# Patient Record
Sex: FEMALE | Race: WHITE | NOT HISPANIC OR LATINO | URBAN - METROPOLITAN AREA
[De-identification: names, ages, dates, MRNs, and addresses within clinical notes are randomized per-mention and may not be internally consistent; named-entity substitution may affect disease eponyms.]

---

## 2021-04-07 ENCOUNTER — OUTPATIENT (OUTPATIENT)
Dept: OUTPATIENT SERVICES | Facility: HOSPITAL | Age: 76
LOS: 1 days | End: 2021-04-07
Payer: MEDICARE

## 2021-04-07 ENCOUNTER — RESULT REVIEW (OUTPATIENT)
Age: 76
End: 2021-04-07

## 2021-04-07 VITALS
RESPIRATION RATE: 16 BRPM | HEIGHT: 61 IN | SYSTOLIC BLOOD PRESSURE: 148 MMHG | TEMPERATURE: 98 F | OXYGEN SATURATION: 99 % | DIASTOLIC BLOOD PRESSURE: 79 MMHG | HEART RATE: 67 BPM | WEIGHT: 171.96 LBS

## 2021-04-07 DIAGNOSIS — Z29.9 ENCOUNTER FOR PROPHYLACTIC MEASURES, UNSPECIFIED: ICD-10-CM

## 2021-04-07 DIAGNOSIS — Z01.818 ENCOUNTER FOR OTHER PREPROCEDURAL EXAMINATION: ICD-10-CM

## 2021-04-07 DIAGNOSIS — M17.12 UNILATERAL PRIMARY OSTEOARTHRITIS, LEFT KNEE: ICD-10-CM

## 2021-04-07 DIAGNOSIS — Z98.890 OTHER SPECIFIED POSTPROCEDURAL STATES: Chronic | ICD-10-CM

## 2021-04-07 DIAGNOSIS — Z98.49 CATARACT EXTRACTION STATUS, UNSPECIFIED EYE: Chronic | ICD-10-CM

## 2021-04-07 LAB
ALBUMIN SERPL ELPH-MCNC: 4 G/DL — SIGNIFICANT CHANGE UP (ref 3.3–5)
ANION GAP SERPL CALC-SCNC: 3 MMOL/L — LOW (ref 5–17)
BASOPHILS # BLD AUTO: 0.06 K/UL — SIGNIFICANT CHANGE UP (ref 0–0.2)
BASOPHILS NFR BLD AUTO: 0.5 % — SIGNIFICANT CHANGE UP (ref 0–2)
BUN SERPL-MCNC: 23 MG/DL — SIGNIFICANT CHANGE UP (ref 7–23)
CALCIUM SERPL-MCNC: 9.6 MG/DL — SIGNIFICANT CHANGE UP (ref 8.5–10.1)
CHLORIDE SERPL-SCNC: 103 MMOL/L — SIGNIFICANT CHANGE UP (ref 96–108)
CO2 SERPL-SCNC: 32 MMOL/L — HIGH (ref 22–31)
CREAT SERPL-MCNC: 0.93 MG/DL — SIGNIFICANT CHANGE UP (ref 0.5–1.3)
EOSINOPHIL # BLD AUTO: 0.19 K/UL — SIGNIFICANT CHANGE UP (ref 0–0.5)
EOSINOPHIL NFR BLD AUTO: 1.7 % — SIGNIFICANT CHANGE UP (ref 0–6)
GLUCOSE SERPL-MCNC: 89 MG/DL — SIGNIFICANT CHANGE UP (ref 70–99)
HCT VFR BLD CALC: 45.4 % — HIGH (ref 34.5–45)
HGB BLD-MCNC: 14.4 G/DL — SIGNIFICANT CHANGE UP (ref 11.5–15.5)
IMM GRANULOCYTES NFR BLD AUTO: 0.3 % — SIGNIFICANT CHANGE UP (ref 0–1.5)
INR BLD: 1.08 RATIO — SIGNIFICANT CHANGE UP (ref 0.88–1.16)
LYMPHOCYTES # BLD AUTO: 2.47 K/UL — SIGNIFICANT CHANGE UP (ref 1–3.3)
LYMPHOCYTES # BLD AUTO: 22 % — SIGNIFICANT CHANGE UP (ref 13–44)
MCHC RBC-ENTMCNC: 28.8 PG — SIGNIFICANT CHANGE UP (ref 27–34)
MCHC RBC-ENTMCNC: 31.7 GM/DL — LOW (ref 32–36)
MCV RBC AUTO: 90.8 FL — SIGNIFICANT CHANGE UP (ref 80–100)
MONOCYTES # BLD AUTO: 0.71 K/UL — SIGNIFICANT CHANGE UP (ref 0–0.9)
MONOCYTES NFR BLD AUTO: 6.3 % — SIGNIFICANT CHANGE UP (ref 2–14)
NEUTROPHILS # BLD AUTO: 7.75 K/UL — HIGH (ref 1.8–7.4)
NEUTROPHILS NFR BLD AUTO: 69.2 % — SIGNIFICANT CHANGE UP (ref 43–77)
PLATELET # BLD AUTO: 351 K/UL — SIGNIFICANT CHANGE UP (ref 150–400)
POTASSIUM SERPL-MCNC: 3.8 MMOL/L — SIGNIFICANT CHANGE UP (ref 3.5–5.3)
POTASSIUM SERPL-SCNC: 3.8 MMOL/L — SIGNIFICANT CHANGE UP (ref 3.5–5.3)
PROTHROM AB SERPL-ACNC: 12.6 SEC — SIGNIFICANT CHANGE UP (ref 10.6–13.6)
RBC # BLD: 5 M/UL — SIGNIFICANT CHANGE UP (ref 3.8–5.2)
RBC # FLD: 12.8 % — SIGNIFICANT CHANGE UP (ref 10.3–14.5)
SODIUM SERPL-SCNC: 138 MMOL/L — SIGNIFICANT CHANGE UP (ref 135–145)
WBC # BLD: 11.21 K/UL — HIGH (ref 3.8–10.5)
WBC # FLD AUTO: 11.21 K/UL — HIGH (ref 3.8–10.5)

## 2021-04-07 PROCEDURE — 71046 X-RAY EXAM CHEST 2 VIEWS: CPT | Mod: 26

## 2021-04-07 PROCEDURE — 80048 BASIC METABOLIC PNL TOTAL CA: CPT

## 2021-04-07 PROCEDURE — 87640 STAPH A DNA AMP PROBE: CPT

## 2021-04-07 PROCEDURE — G0463: CPT | Mod: 25

## 2021-04-07 PROCEDURE — 71046 X-RAY EXAM CHEST 2 VIEWS: CPT

## 2021-04-07 PROCEDURE — 82040 ASSAY OF SERUM ALBUMIN: CPT

## 2021-04-07 PROCEDURE — 93010 ELECTROCARDIOGRAM REPORT: CPT

## 2021-04-07 PROCEDURE — 83036 HEMOGLOBIN GLYCOSYLATED A1C: CPT

## 2021-04-07 PROCEDURE — 93005 ELECTROCARDIOGRAM TRACING: CPT

## 2021-04-07 PROCEDURE — 86900 BLOOD TYPING SEROLOGIC ABO: CPT

## 2021-04-07 PROCEDURE — 87641 MR-STAPH DNA AMP PROBE: CPT

## 2021-04-07 PROCEDURE — 85025 COMPLETE CBC W/AUTO DIFF WBC: CPT

## 2021-04-07 PROCEDURE — 86850 RBC ANTIBODY SCREEN: CPT

## 2021-04-07 PROCEDURE — 86901 BLOOD TYPING SEROLOGIC RH(D): CPT

## 2021-04-07 PROCEDURE — 85610 PROTHROMBIN TIME: CPT

## 2021-04-07 PROCEDURE — 36415 COLL VENOUS BLD VENIPUNCTURE: CPT

## 2021-04-07 NOTE — H&P PST ADULT - NSICDXPASTSURGICALHX_GEN_ALL_CORE_FT
PAST SURGICAL HISTORY:  H/O ovarian cystectomy 1980 with appendectomy    History of cataract surgery 2019 2020

## 2021-04-07 NOTE — H&P PST ADULT - ASSESSMENT
75 year old female presents to PST for planned left TKR    Plan:  1. PST instructions given ; NPO status instructions to be given by ASU   2. Pt instructed to take following meds with sip of water :   3. Pt instructed to take routine evening medications unless indicated   4. Stop NSAIDS ( Aspirin Alev Motrin Mobic Diclofenac), herbal supplements , MVI , Vitamin fish oil 7 days prior to surgery  unless   directed by surgeon or cardiologist;   5. Medical Optimization  with    6. EZ wash instructions given & mupirocin instructions given  7. Labs EKG CXR as per surgeon request   8. Pt instructed to self quarantine after Covid test   9. Covid Testing scheduled Pt notified and aware  10. Pt denies covid symptoms shortness of breath fever cough         CAPRINI SCORE [CLOT]    AGE RELATED RISK FACTORS                                                       MOBILITY RELATED FACTORS  [ ] Age 41-60 years                                            (1 Point)                  [ ] Bed rest                                                        (1 Point)  [ ] Age: 61-74 years                                           (2 Points)                 [ ] Plaster cast                                                   (2 Points)  [ ] Age= 75 years                                              (3 Points)                 [ ] Bed bound for more than 72 hours                 (2 Points)    DISEASE RELATED RISK FACTORS                                               GENDER SPECIFIC FACTORS  [ ] Edema in the lower extremities                       (1 Point)                  [ ] Pregnancy                                                     (1 Point)  [ ] Varicose veins                                               (1 Point)                  [ ] Post-partum < 6 weeks                                   (1 Point)             [ ] BMI > 25 Kg/m2                                            (1 Point)                  [ ] Hormonal therapy  or oral contraception          (1 Point)                 [ ] Sepsis (in the previous month)                        (1 Point)                  [ ] History of pregnancy complications                 (1 point)  [ ] Pneumonia or serious lung disease                                               [ ] Unexplained or recurrent                     (1 Point)           (in the previous month)                               (1 Point)  [ ] Abnormal pulmonary function test                     (1 Point)                 SURGERY RELATED RISK FACTORS  [ ] Acute myocardial infarction                              (1 Point)                 [ ]  Section                                             (1 Point)  [ ] Congestive heart failure (in the previous month)  (1 Point)               [ ] Minor surgery                                                  (1 Point)   [ ] Inflammatory bowel disease                             (1 Point)                 [ ] Arthroscopic surgery                                        (2 Points)  [ ] Central venous access                                      (2 Points)                [ ] General surgery lasting more than 45 minutes   (2 Points)       [ ] Stroke (in the previous month)                          (5 Points)               [ ] Elective arthroplasty                                         (5 Points)            ( ) presents and past malignancy                           (2 points)                                                                                                         HEMATOLOGY RELATED FACTORS                                                 TRAUMA RELATED RISK FACTORS  [ ] Prior episodes of VTE                                     (3 Points)                 [ ] Fracture of the hip, pelvis, or leg                       (5 Points)  [ ] Positive family history for VTE                         (3 Points)                 [ ] Acute spinal cord injury (in the previous month)  (5 Points)  [ ] Prothrombin 41431 A                                     (3 Points)                 [ ] Paralysis  (less than 1 month)                             (5 Points)  [ ] Factor V Leiden                                             (3 Points)                  [ ] Multiple Trauma within 1 month                        (5 Points)  [ ] Lupus anticoagulants                                     (3 Points)                                                           [ ] Anticardiolipin antibodies                               (3 Points)                                                       [ ] High homocysteine in the blood                      (3 Points)                                             [ ] Other congenital or acquired thrombophilia      (3 Points)                                                [ ] Heparin induced thrombocytopenia                  (3 Points)                                          Total Score [          ] 75 year old female presents to PST for planned left TKR    Plan:  1. PST instructions given ; NPO status instructions to be given by ASU   2. Pt instructed to take following meds with sip of water : singulair  3. Pt instructed to take routine evening medications unless indicated   4. Stop NSAIDS ( Aspirin Alev Motrin Mobic Diclofenac), herbal supplements , MVI , Vitamin fish oil 7 days prior to surgery  unless   directed by surgeon or cardiologist;   5. Medical Optimization  with Dr Gao  6. EZ wash instructions given & mupirocin instructions given  7. Labs EKG CXR as per surgeon request   8. Pt instructed to self quarantine after Covid test   9. Covid Testing scheduled Pt notified and aware  10. Pt denies covid symptoms shortness of breath fever cough         CAPRINI SCORE [CLOT]    AGE RELATED RISK FACTORS                                                       MOBILITY RELATED FACTORS  [ ] Age 41-60 years                                            (1 Point)                  [ ] Bed rest                                                        (1 Point)  [ ] Age: 61-74 years                                           (2 Points)                 [ ] Plaster cast                                                   (2 Points)  [ x] Age= 75 years                                              (3 Points)                 [ ] Bed bound for more than 72 hours                 (2 Points)    DISEASE RELATED RISK FACTORS                                               GENDER SPECIFIC FACTORS  [ ] Edema in the lower extremities                       (1 Point)                  [ ] Pregnancy                                                     (1 Point)  [ ] Varicose veins                                               (1 Point)                  [ ] Post-partum < 6 weeks                                   (1 Point)             [x ] BMI > 25 Kg/m2                                            (1 Point)                  [ ] Hormonal therapy  or oral contraception          (1 Point)                 [ ] Sepsis (in the previous month)                        (1 Point)                  [ ] History of pregnancy complications                 (1 point)  [ ] Pneumonia or serious lung disease                                               [ ] Unexplained or recurrent                     (1 Point)           (in the previous month)                               (1 Point)  [ ] Abnormal pulmonary function test                     (1 Point)                 SURGERY RELATED RISK FACTORS  [ ] Acute myocardial infarction                              (1 Point)                 [ ]  Section                                             (1 Point)  [ ] Congestive heart failure (in the previous month)  (1 Point)               [ ] Minor surgery                                                  (1 Point)   [ ] Inflammatory bowel disease                             (1 Point)                 [ ] Arthroscopic surgery                                        (2 Points)  [ ] Central venous access                                      (2 Points)                [ ] General surgery lasting more than 45 minutes   (2 Points)       [ ] Stroke (in the previous month)                          (5 Points)               [x ] Elective arthroplasty                                         (5 Points)            ( ) presents and past malignancy                           (2 points)                                                                                                         HEMATOLOGY RELATED FACTORS                                                 TRAUMA RELATED RISK FACTORS  [ ] Prior episodes of VTE                                     (3 Points)                 [ ] Fracture of the hip, pelvis, or leg                       (5 Points)  [ ] Positive family history for VTE                         (3 Points)                 [ ] Acute spinal cord injury (in the previous month)  (5 Points)  [ ] Prothrombin 00953 A                                     (3 Points)                 [ ] Paralysis  (less than 1 month)                             (5 Points)  [ ] Factor V Leiden                                             (3 Points)                  [ ] Multiple Trauma within 1 month                        (5 Points)  [ ] Lupus anticoagulants                                     (3 Points)                                                           [ ] Anticardiolipin antibodies                               (3 Points)                                                       [ ] High homocysteine in the blood                      (3 Points)                                             [ ] Other congenital or acquired thrombophilia      (3 Points)                                                [ ] Heparin induced thrombocytopenia                  (3 Points)                                          Total Score [     9     ] 75 year old female presents to PST for planned left TKR    Plan:  1. PST instructions given ; NPO status instructions to be given by ASU   2. Pt instructed to take following meds with sip of water : singulair  3. Pt instructed to take routine evening medications unless indicated   4. Stop NSAIDS ( Aspirin Alev Motrin Mobic Diclofenac), herbal supplements , MVI , Vitamin fish oil 7 days prior to surgery  unless   directed by surgeon or cardiologist;   5. Medical Optimization  with Dr Gao  6. EZ wash instructions given & mupirocin instructions given  7. Labs EKG CXR as per surgeon request   8. Pt instructed to self quarantine after Covid test   9. Covid Testing scheduled Pt notified and aware  10. Pt denies covid symptoms shortness of breath fever cough   11. Patient with first degree relatives with malignant hyperthermia pt herself has not had an episode of malignant hyperthermia; Anesthesia consult with Dr Yost over the phone as per pt         CAPRINI SCORE [CLOT]    AGE RELATED RISK FACTORS                                                       MOBILITY RELATED FACTORS  [ ] Age 41-60 years                                            (1 Point)                  [ ] Bed rest                                                        (1 Point)  [ ] Age: 61-74 years                                           (2 Points)                 [ ] Plaster cast                                                   (2 Points)  [ x] Age= 75 years                                              (3 Points)                 [ ] Bed bound for more than 72 hours                 (2 Points)    DISEASE RELATED RISK FACTORS                                               GENDER SPECIFIC FACTORS  [ ] Edema in the lower extremities                       (1 Point)                  [ ] Pregnancy                                                     (1 Point)  [ ] Varicose veins                                               (1 Point)                  [ ] Post-partum < 6 weeks                                   (1 Point)             [x ] BMI > 25 Kg/m2                                            (1 Point)                  [ ] Hormonal therapy  or oral contraception          (1 Point)                 [ ] Sepsis (in the previous month)                        (1 Point)                  [ ] History of pregnancy complications                 (1 point)  [ ] Pneumonia or serious lung disease                                               [ ] Unexplained or recurrent                     (1 Point)           (in the previous month)                               (1 Point)  [ ] Abnormal pulmonary function test                     (1 Point)                 SURGERY RELATED RISK FACTORS  [ ] Acute myocardial infarction                              (1 Point)                 [ ]  Section                                             (1 Point)  [ ] Congestive heart failure (in the previous month)  (1 Point)               [ ] Minor surgery                                                  (1 Point)   [ ] Inflammatory bowel disease                             (1 Point)                 [ ] Arthroscopic surgery                                        (2 Points)  [ ] Central venous access                                      (2 Points)                [ ] General surgery lasting more than 45 minutes   (2 Points)       [ ] Stroke (in the previous month)                          (5 Points)               [x ] Elective arthroplasty                                         (5 Points)            ( ) presents and past malignancy                           (2 points)                                                                                                         HEMATOLOGY RELATED FACTORS                                                 TRAUMA RELATED RISK FACTORS  [ ] Prior episodes of VTE                                     (3 Points)                 [ ] Fracture of the hip, pelvis, or leg                       (5 Points)  [ ] Positive family history for VTE                         (3 Points)                 [ ] Acute spinal cord injury (in the previous month)  (5 Points)  [ ] Prothrombin 35458 A                                     (3 Points)                 [ ] Paralysis  (less than 1 month)                             (5 Points)  [ ] Factor V Leiden                                             (3 Points)                  [ ] Multiple Trauma within 1 month                        (5 Points)  [ ] Lupus anticoagulants                                     (3 Points)                                                           [ ] Anticardiolipin antibodies                               (3 Points)                                                       [ ] High homocysteine in the blood                      (3 Points)                                             [ ] Other congenital or acquired thrombophilia      (3 Points)                                                [ ] Heparin induced thrombocytopenia                  (3 Points)                                          Total Score [     9     ]

## 2021-04-07 NOTE — H&P PST ADULT - HEALTH CARE MAINTENANCE
flu vaccine and covid vaccine current   [place] Pt denies  travel internationally for the past 21 days

## 2021-04-07 NOTE — H&P PST ADULT - ANESTHESIA, PREVIOUS REACTION, PROFILE
first cousins with malignant hyperthermia spoke with Dr Yost anesthesiologist first cousins with malignant hyperthermia spoke with Dr Yost anesthesiologist/none

## 2021-04-07 NOTE — H&P PST ADULT - NSICDXFAMILYHX_GEN_ALL_CORE_FT
FAMILY HISTORY:  Mother  Still living? Unknown  FH: Alzheimers disease, Age at diagnosis: Age Unknown  FH: hypertension, Age at diagnosis: Age Unknown

## 2021-04-07 NOTE — H&P PST ADULT - NSICDXPASTMEDICALHX_GEN_ALL_CORE_FT
PAST MEDICAL HISTORY:  Arthritis     Asthma controlled; never intubated    Bakers cyst, left     HLD (hyperlipidemia)     HTN (hypertension)     Liver cyst     Lumbar herniated disc     Microscopic hematuria history    Seasonal allergies      PAST MEDICAL HISTORY:  Arthritis     Asthma controlled; never intubated    Bakers cyst, left     DDD (degenerative disc disease), cervical     HLD (hyperlipidemia)     HTN (hypertension)     Liver cyst     Lumbar herniated disc     Microscopic hematuria history    Seasonal allergies

## 2021-04-08 DIAGNOSIS — M17.12 UNILATERAL PRIMARY OSTEOARTHRITIS, LEFT KNEE: ICD-10-CM

## 2021-04-08 DIAGNOSIS — Z01.818 ENCOUNTER FOR OTHER PREPROCEDURAL EXAMINATION: ICD-10-CM

## 2021-04-08 LAB
A1C WITH ESTIMATED AVERAGE GLUCOSE RESULT: 5.4 % — SIGNIFICANT CHANGE UP (ref 4–5.6)
ESTIMATED AVERAGE GLUCOSE: 108 MG/DL — SIGNIFICANT CHANGE UP (ref 68–114)
MRSA PCR RESULT.: SIGNIFICANT CHANGE UP
S AUREUS DNA NOSE QL NAA+PROBE: DETECTED

## 2021-04-09 RX ORDER — ATORVASTATIN CALCIUM 80 MG/1
0 TABLET, FILM COATED ORAL
Qty: 0 | Refills: 0 | DISCHARGE

## 2021-04-09 NOTE — CHART NOTE - NSCHARTNOTEFT_GEN_A_CORE
MSSA detected from nasal swab done 4/7/2021. Patient instructed on the use of Mupirocin. To start using Mupirocin twice per day nasally, 4/11/2021  to 4/15/ 2021.

## 2021-04-12 PROBLEM — Z00.00 ENCOUNTER FOR PREVENTIVE HEALTH EXAMINATION: Status: ACTIVE | Noted: 2021-04-12

## 2021-04-13 ENCOUNTER — APPOINTMENT (OUTPATIENT)
Dept: DISASTER EMERGENCY | Facility: CLINIC | Age: 76
End: 2021-04-13

## 2021-04-13 DIAGNOSIS — Z01.818 ENCOUNTER FOR OTHER PREPROCEDURAL EXAMINATION: ICD-10-CM

## 2021-04-14 LAB — SARS-COV-2 N GENE NPH QL NAA+PROBE: NOT DETECTED

## 2021-04-15 ENCOUNTER — FORM ENCOUNTER (OUTPATIENT)
Age: 76
End: 2021-04-15

## 2021-04-15 RX ORDER — SODIUM CHLORIDE 9 MG/ML
3 INJECTION INTRAMUSCULAR; INTRAVENOUS; SUBCUTANEOUS EVERY 8 HOURS
Refills: 0 | Status: DISCONTINUED | OUTPATIENT
Start: 2021-04-16 | End: 2021-04-17

## 2021-04-16 ENCOUNTER — TRANSCRIPTION ENCOUNTER (OUTPATIENT)
Age: 76
End: 2021-04-16

## 2021-04-16 ENCOUNTER — OUTPATIENT (OUTPATIENT)
Dept: INPATIENT UNIT | Facility: HOSPITAL | Age: 76
LOS: 1 days | Discharge: HOME HEALTH | End: 2021-04-16
Payer: MEDICARE

## 2021-04-16 ENCOUNTER — RESULT REVIEW (OUTPATIENT)
Age: 76
End: 2021-04-16

## 2021-04-16 VITALS
WEIGHT: 169.98 LBS | TEMPERATURE: 98 F | DIASTOLIC BLOOD PRESSURE: 84 MMHG | HEART RATE: 75 BPM | OXYGEN SATURATION: 100 % | RESPIRATION RATE: 16 BRPM | SYSTOLIC BLOOD PRESSURE: 128 MMHG

## 2021-04-16 DIAGNOSIS — E87.6 HYPOKALEMIA: ICD-10-CM

## 2021-04-16 DIAGNOSIS — M81.0 AGE-RELATED OSTEOPOROSIS WITHOUT CURRENT PATHOLOGICAL FRACTURE: ICD-10-CM

## 2021-04-16 DIAGNOSIS — M85.80 OTHER SPECIFIED DISORDERS OF BONE DENSITY AND STRUCTURE, UNSPECIFIED SITE: ICD-10-CM

## 2021-04-16 DIAGNOSIS — Z88.8 ALLERGY STATUS TO OTHER DRUGS, MEDICAMENTS AND BIOLOGICAL SUBSTANCES STATUS: ICD-10-CM

## 2021-04-16 DIAGNOSIS — M17.12 UNILATERAL PRIMARY OSTEOARTHRITIS, LEFT KNEE: ICD-10-CM

## 2021-04-16 DIAGNOSIS — Z79.899 OTHER LONG TERM (CURRENT) DRUG THERAPY: ICD-10-CM

## 2021-04-16 DIAGNOSIS — G89.29 OTHER CHRONIC PAIN: ICD-10-CM

## 2021-04-16 DIAGNOSIS — J45.909 UNSPECIFIED ASTHMA, UNCOMPLICATED: ICD-10-CM

## 2021-04-16 DIAGNOSIS — M21.062 VALGUS DEFORMITY, NOT ELSEWHERE CLASSIFIED, LEFT KNEE: ICD-10-CM

## 2021-04-16 DIAGNOSIS — Z98.890 OTHER SPECIFIED POSTPROCEDURAL STATES: Chronic | ICD-10-CM

## 2021-04-16 DIAGNOSIS — K76.89 OTHER SPECIFIED DISEASES OF LIVER: ICD-10-CM

## 2021-04-16 DIAGNOSIS — Z98.49 CATARACT EXTRACTION STATUS, UNSPECIFIED EYE: Chronic | ICD-10-CM

## 2021-04-16 DIAGNOSIS — E66.9 OBESITY, UNSPECIFIED: ICD-10-CM

## 2021-04-16 DIAGNOSIS — Z91.040 LATEX ALLERGY STATUS: ICD-10-CM

## 2021-04-16 DIAGNOSIS — E78.00 PURE HYPERCHOLESTEROLEMIA, UNSPECIFIED: ICD-10-CM

## 2021-04-16 DIAGNOSIS — I10 ESSENTIAL (PRIMARY) HYPERTENSION: ICD-10-CM

## 2021-04-16 DIAGNOSIS — Z88.1 ALLERGY STATUS TO OTHER ANTIBIOTIC AGENTS STATUS: ICD-10-CM

## 2021-04-16 DIAGNOSIS — M50.30 OTHER CERVICAL DISC DEGENERATION, UNSPECIFIED CERVICAL REGION: ICD-10-CM

## 2021-04-16 LAB
ANION GAP SERPL CALC-SCNC: 6 MMOL/L — SIGNIFICANT CHANGE UP (ref 5–17)
BUN SERPL-MCNC: 22 MG/DL — SIGNIFICANT CHANGE UP (ref 7–23)
CALCIUM SERPL-MCNC: 8.1 MG/DL — LOW (ref 8.5–10.1)
CHLORIDE SERPL-SCNC: 108 MMOL/L — SIGNIFICANT CHANGE UP (ref 96–108)
CO2 SERPL-SCNC: 27 MMOL/L — SIGNIFICANT CHANGE UP (ref 22–31)
CREAT SERPL-MCNC: 0.97 MG/DL — SIGNIFICANT CHANGE UP (ref 0.5–1.3)
GLUCOSE SERPL-MCNC: 81 MG/DL — SIGNIFICANT CHANGE UP (ref 70–99)
HCT VFR BLD CALC: 39.7 % — SIGNIFICANT CHANGE UP (ref 34.5–45)
HGB BLD-MCNC: 12.4 G/DL — SIGNIFICANT CHANGE UP (ref 11.5–15.5)
MCHC RBC-ENTMCNC: 28.6 PG — SIGNIFICANT CHANGE UP (ref 27–34)
MCHC RBC-ENTMCNC: 31.2 GM/DL — LOW (ref 32–36)
MCV RBC AUTO: 91.7 FL — SIGNIFICANT CHANGE UP (ref 80–100)
PLATELET # BLD AUTO: 253 K/UL — SIGNIFICANT CHANGE UP (ref 150–400)
POTASSIUM SERPL-MCNC: 3.3 MMOL/L — LOW (ref 3.5–5.3)
POTASSIUM SERPL-SCNC: 3.3 MMOL/L — LOW (ref 3.5–5.3)
RBC # BLD: 4.33 M/UL — SIGNIFICANT CHANGE UP (ref 3.8–5.2)
RBC # FLD: 12.9 % — SIGNIFICANT CHANGE UP (ref 10.3–14.5)
SODIUM SERPL-SCNC: 141 MMOL/L — SIGNIFICANT CHANGE UP (ref 135–145)
WBC # BLD: 11.13 K/UL — HIGH (ref 3.8–10.5)
WBC # FLD AUTO: 11.13 K/UL — HIGH (ref 3.8–10.5)

## 2021-04-16 PROCEDURE — 99222 1ST HOSP IP/OBS MODERATE 55: CPT

## 2021-04-16 PROCEDURE — 80048 BASIC METABOLIC PNL TOTAL CA: CPT

## 2021-04-16 PROCEDURE — C1776: CPT

## 2021-04-16 PROCEDURE — 73560 X-RAY EXAM OF KNEE 1 OR 2: CPT | Mod: 26,LT

## 2021-04-16 PROCEDURE — 99223 1ST HOSP IP/OBS HIGH 75: CPT

## 2021-04-16 PROCEDURE — 85027 COMPLETE CBC AUTOMATED: CPT

## 2021-04-16 PROCEDURE — 97530 THERAPEUTIC ACTIVITIES: CPT | Mod: GP

## 2021-04-16 PROCEDURE — 82962 GLUCOSE BLOOD TEST: CPT

## 2021-04-16 PROCEDURE — 97116 GAIT TRAINING THERAPY: CPT | Mod: GP

## 2021-04-16 PROCEDURE — 88305 TISSUE EXAM BY PATHOLOGIST: CPT | Mod: 26

## 2021-04-16 PROCEDURE — 27447 TOTAL KNEE ARTHROPLASTY: CPT | Mod: AS

## 2021-04-16 PROCEDURE — 88305 TISSUE EXAM BY PATHOLOGIST: CPT

## 2021-04-16 PROCEDURE — C1713: CPT

## 2021-04-16 PROCEDURE — 73560 X-RAY EXAM OF KNEE 1 OR 2: CPT | Mod: LT

## 2021-04-16 PROCEDURE — 86769 SARS-COV-2 COVID-19 ANTIBODY: CPT

## 2021-04-16 PROCEDURE — 36415 COLL VENOUS BLD VENIPUNCTURE: CPT

## 2021-04-16 PROCEDURE — 97162 PT EVAL MOD COMPLEX 30 MIN: CPT | Mod: GP

## 2021-04-16 RX ORDER — RIVAROXABAN 15 MG-20MG
1 KIT ORAL
Qty: 10 | Refills: 0
Start: 2021-04-16 | End: 2021-04-25

## 2021-04-16 RX ORDER — ASPIRIN/CALCIUM CARB/MAGNESIUM 324 MG
325 TABLET ORAL
Refills: 0 | Status: DISCONTINUED | OUTPATIENT
Start: 2021-04-16 | End: 2021-04-16

## 2021-04-16 RX ORDER — POLYETHYLENE GLYCOL 3350 17 G/17G
17 POWDER, FOR SOLUTION ORAL
Qty: 1 | Refills: 0
Start: 2021-04-16

## 2021-04-16 RX ORDER — FLUVASTATIN SODIUM 80 MG/1
20 TABLET, FILM COATED, EXTENDED RELEASE ORAL AT BEDTIME
Refills: 0 | Status: DISCONTINUED | OUTPATIENT
Start: 2021-04-16 | End: 2021-04-17

## 2021-04-16 RX ORDER — HYDROMORPHONE HYDROCHLORIDE 2 MG/ML
0.5 INJECTION INTRAMUSCULAR; INTRAVENOUS; SUBCUTANEOUS EVERY 4 HOURS
Refills: 0 | Status: DISCONTINUED | OUTPATIENT
Start: 2021-04-16 | End: 2021-04-16

## 2021-04-16 RX ORDER — OXYCODONE HYDROCHLORIDE 5 MG/1
5 TABLET ORAL ONCE
Refills: 0 | Status: DISCONTINUED | OUTPATIENT
Start: 2021-04-16 | End: 2021-04-16

## 2021-04-16 RX ORDER — FOLIC ACID 0.8 MG
1 TABLET ORAL DAILY
Refills: 0 | Status: DISCONTINUED | OUTPATIENT
Start: 2021-04-16 | End: 2021-04-17

## 2021-04-16 RX ORDER — ALBUTEROL 90 UG/1
2 AEROSOL, METERED ORAL
Qty: 0 | Refills: 0 | DISCHARGE

## 2021-04-16 RX ORDER — ACETAMINOPHEN 500 MG
975 TABLET ORAL ONCE
Refills: 0 | Status: COMPLETED | OUTPATIENT
Start: 2021-04-16 | End: 2021-04-16

## 2021-04-16 RX ORDER — ERGOCALCIFEROL 1.25 MG/1
0 CAPSULE ORAL
Qty: 0 | Refills: 0 | DISCHARGE

## 2021-04-16 RX ORDER — SENNA PLUS 8.6 MG/1
2 TABLET ORAL AT BEDTIME
Refills: 0 | Status: DISCONTINUED | OUTPATIENT
Start: 2021-04-16 | End: 2021-04-17

## 2021-04-16 RX ORDER — SODIUM CHLORIDE 9 MG/ML
1000 INJECTION, SOLUTION INTRAVENOUS
Refills: 0 | Status: DISCONTINUED | OUTPATIENT
Start: 2021-04-16 | End: 2021-04-16

## 2021-04-16 RX ORDER — LOSARTAN POTASSIUM 100 MG/1
1 TABLET, FILM COATED ORAL
Qty: 0 | Refills: 0 | DISCHARGE

## 2021-04-16 RX ORDER — FENTANYL CITRATE 50 UG/ML
50 INJECTION INTRAVENOUS
Refills: 0 | Status: DISCONTINUED | OUTPATIENT
Start: 2021-04-16 | End: 2021-04-16

## 2021-04-16 RX ORDER — OXYCODONE HYDROCHLORIDE 5 MG/1
10 TABLET ORAL EVERY 4 HOURS
Refills: 0 | Status: DISCONTINUED | OUTPATIENT
Start: 2021-04-16 | End: 2021-04-17

## 2021-04-16 RX ORDER — PREDNISOLONE 5 MG
40 TABLET ORAL
Qty: 0 | Refills: 0 | DISCHARGE

## 2021-04-16 RX ORDER — CEFAZOLIN SODIUM 1 G
2000 VIAL (EA) INJECTION EVERY 8 HOURS
Refills: 0 | Status: COMPLETED | OUTPATIENT
Start: 2021-04-16 | End: 2021-04-17

## 2021-04-16 RX ORDER — ALBUTEROL 90 UG/1
2 AEROSOL, METERED ORAL EVERY 4 HOURS
Refills: 0 | Status: DISCONTINUED | OUTPATIENT
Start: 2021-04-16 | End: 2021-04-17

## 2021-04-16 RX ORDER — PANTOPRAZOLE SODIUM 20 MG/1
40 TABLET, DELAYED RELEASE ORAL ONCE
Refills: 0 | Status: COMPLETED | OUTPATIENT
Start: 2021-04-16 | End: 2021-04-16

## 2021-04-16 RX ORDER — ONDANSETRON 8 MG/1
4 TABLET, FILM COATED ORAL ONCE
Refills: 0 | Status: DISCONTINUED | OUTPATIENT
Start: 2021-04-16 | End: 2021-04-16

## 2021-04-16 RX ORDER — FERROUS SULFATE 325(65) MG
325 TABLET ORAL DAILY
Refills: 0 | Status: DISCONTINUED | OUTPATIENT
Start: 2021-04-16 | End: 2021-04-17

## 2021-04-16 RX ORDER — HYDROCHLOROTHIAZIDE 25 MG
12.5 TABLET ORAL DAILY
Refills: 0 | Status: DISCONTINUED | OUTPATIENT
Start: 2021-04-16 | End: 2021-04-16

## 2021-04-16 RX ORDER — PANTOPRAZOLE SODIUM 20 MG/1
40 TABLET, DELAYED RELEASE ORAL
Refills: 0 | Status: DISCONTINUED | OUTPATIENT
Start: 2021-04-16 | End: 2021-04-17

## 2021-04-16 RX ORDER — ATORVASTATIN CALCIUM 80 MG/1
10 TABLET, FILM COATED ORAL AT BEDTIME
Refills: 0 | Status: DISCONTINUED | OUTPATIENT
Start: 2021-04-16 | End: 2021-04-16

## 2021-04-16 RX ORDER — MONTELUKAST 4 MG/1
10 TABLET, CHEWABLE ORAL DAILY
Refills: 0 | Status: DISCONTINUED | OUTPATIENT
Start: 2021-04-16 | End: 2021-04-17

## 2021-04-16 RX ORDER — POTASSIUM CHLORIDE 20 MEQ
40 PACKET (EA) ORAL ONCE
Refills: 0 | Status: COMPLETED | OUTPATIENT
Start: 2021-04-16 | End: 2021-04-16

## 2021-04-16 RX ORDER — PROCHLORPERAZINE MALEATE 5 MG
10 TABLET ORAL EVERY 8 HOURS
Refills: 0 | Status: DISCONTINUED | OUTPATIENT
Start: 2021-04-16 | End: 2021-04-17

## 2021-04-16 RX ORDER — ASCORBIC ACID 60 MG
500 TABLET,CHEWABLE ORAL
Refills: 0 | Status: DISCONTINUED | OUTPATIENT
Start: 2021-04-16 | End: 2021-04-17

## 2021-04-16 RX ORDER — FLUVASTATIN SODIUM 80 MG/1
1 TABLET, FILM COATED, EXTENDED RELEASE ORAL
Qty: 0 | Refills: 0 | DISCHARGE

## 2021-04-16 RX ORDER — RIVAROXABAN 15 MG-20MG
10 KIT ORAL
Refills: 0 | Status: DISCONTINUED | OUTPATIENT
Start: 2021-04-16 | End: 2021-04-17

## 2021-04-16 RX ORDER — OXYCODONE HYDROCHLORIDE 5 MG/1
5 TABLET ORAL EVERY 4 HOURS
Refills: 0 | Status: DISCONTINUED | OUTPATIENT
Start: 2021-04-16 | End: 2021-04-17

## 2021-04-16 RX ORDER — OXYCODONE HYDROCHLORIDE 5 MG/1
1 TABLET ORAL
Qty: 30 | Refills: 0
Start: 2021-04-16

## 2021-04-16 RX ORDER — FAMOTIDINE 10 MG/ML
20 INJECTION INTRAVENOUS ONCE
Refills: 0 | Status: COMPLETED | OUTPATIENT
Start: 2021-04-16 | End: 2021-04-16

## 2021-04-16 RX ORDER — LOSARTAN POTASSIUM 100 MG/1
100 TABLET, FILM COATED ORAL DAILY
Refills: 0 | Status: DISCONTINUED | OUTPATIENT
Start: 2021-04-16 | End: 2021-04-16

## 2021-04-16 RX ORDER — HYDROMORPHONE HYDROCHLORIDE 2 MG/ML
0.5 INJECTION INTRAMUSCULAR; INTRAVENOUS; SUBCUTANEOUS EVERY 4 HOURS
Refills: 0 | Status: DISCONTINUED | OUTPATIENT
Start: 2021-04-16 | End: 2021-04-17

## 2021-04-16 RX ORDER — LOSARTAN/HYDROCHLOROTHIAZIDE 100MG-25MG
1 TABLET ORAL
Qty: 0 | Refills: 0 | DISCHARGE

## 2021-04-16 RX ORDER — OMEGA-3 ACID ETHYL ESTERS 1 G
0 CAPSULE ORAL
Qty: 0 | Refills: 0 | DISCHARGE

## 2021-04-16 RX ORDER — PANTOPRAZOLE SODIUM 20 MG/1
1 TABLET, DELAYED RELEASE ORAL
Qty: 30 | Refills: 0
Start: 2021-04-16 | End: 2021-05-15

## 2021-04-16 RX ORDER — CELECOXIB 200 MG/1
200 CAPSULE ORAL EVERY 12 HOURS
Refills: 0 | Status: DISCONTINUED | OUTPATIENT
Start: 2021-04-17 | End: 2021-04-17

## 2021-04-16 RX ORDER — POLYETHYLENE GLYCOL 3350 17 G/17G
17 POWDER, FOR SOLUTION ORAL AT BEDTIME
Refills: 0 | Status: DISCONTINUED | OUTPATIENT
Start: 2021-04-16 | End: 2021-04-17

## 2021-04-16 RX ORDER — ACETAMINOPHEN 500 MG
975 TABLET ORAL EVERY 8 HOURS
Refills: 0 | Status: DISCONTINUED | OUTPATIENT
Start: 2021-04-16 | End: 2021-04-17

## 2021-04-16 RX ORDER — ONDANSETRON 8 MG/1
8 TABLET, FILM COATED ORAL EVERY 8 HOURS
Refills: 0 | Status: DISCONTINUED | OUTPATIENT
Start: 2021-04-16 | End: 2021-04-17

## 2021-04-16 RX ORDER — ACETAMINOPHEN 500 MG
2 TABLET ORAL
Qty: 84 | Refills: 0
Start: 2021-04-16 | End: 2021-04-29

## 2021-04-16 RX ORDER — MONTELUKAST 4 MG/1
1 TABLET, CHEWABLE ORAL
Qty: 0 | Refills: 0 | DISCHARGE

## 2021-04-16 RX ORDER — SODIUM CHLORIDE 9 MG/ML
1000 INJECTION, SOLUTION INTRAVENOUS
Refills: 0 | Status: DISCONTINUED | OUTPATIENT
Start: 2021-04-17 | End: 2021-04-17

## 2021-04-16 RX ADMIN — Medication 975 MILLIGRAM(S): at 14:37

## 2021-04-16 RX ADMIN — Medication 100 MILLIGRAM(S): at 16:39

## 2021-04-16 RX ADMIN — FAMOTIDINE 20 MILLIGRAM(S): 10 INJECTION INTRAVENOUS at 06:29

## 2021-04-16 RX ADMIN — FLUVASTATIN SODIUM 20 MILLIGRAM(S): 80 TABLET, FILM COATED, EXTENDED RELEASE ORAL at 21:36

## 2021-04-16 RX ADMIN — Medication 10 MILLIGRAM(S): at 22:07

## 2021-04-16 RX ADMIN — ONDANSETRON 8 MILLIGRAM(S): 8 TABLET, FILM COATED ORAL at 19:14

## 2021-04-16 RX ADMIN — RIVAROXABAN 10 MILLIGRAM(S): KIT at 18:37

## 2021-04-16 RX ADMIN — Medication 975 MILLIGRAM(S): at 06:29

## 2021-04-16 RX ADMIN — Medication 40 MILLIEQUIVALENT(S): at 14:36

## 2021-04-16 RX ADMIN — SODIUM CHLORIDE 3 MILLILITER(S): 9 INJECTION INTRAMUSCULAR; INTRAVENOUS; SUBCUTANEOUS at 21:37

## 2021-04-16 RX ADMIN — Medication 975 MILLIGRAM(S): at 06:30

## 2021-04-16 RX ADMIN — Medication 40 MILLIGRAM(S): at 13:18

## 2021-04-16 RX ADMIN — PANTOPRAZOLE SODIUM 40 MILLIGRAM(S): 20 TABLET, DELAYED RELEASE ORAL at 06:30

## 2021-04-16 RX ADMIN — SODIUM CHLORIDE 100 MILLILITER(S): 9 INJECTION, SOLUTION INTRAVENOUS at 10:00

## 2021-04-16 NOTE — DISCHARGE NOTE NURSING/CASE MANAGEMENT/SOCIAL WORK - PATIENT PORTAL LINK FT
You can access the FollowMyHealth Patient Portal offered by [facility] by registering at the following website: [email or web address]. By joining [employer]’s FollowMyHealth portal, you will also be able to view your health information using other applications (apps) compatible with our system.

## 2021-04-16 NOTE — DISCHARGE NOTE PROVIDER - CARE PROVIDER_API CALL
Jeffrey Stephenson)  Orthopaedic Surgery  [address] [place] [address] [place] [address]  Phone: [phone]  Fax: [phone]  Follow Up Time:

## 2021-04-16 NOTE — DISCHARGE NOTE PROVIDER - NSDCMRMEDTOKEN_GEN_ALL_CORE_FT
Calcium 600+D oral tablet:   Hyzaar 100 mg-12.5 mg oral tablet: 1 tab(s) orally once a day  Lescol 20 mg oral capsule: 1 cap(s) orally once a day  MiraLax oral powder for reconstitution: 17 gram(s) orally once a day, As Needed   Omega-3 oral capsule:   oxyCODONE 5 mg oral tablet: 1 tab(s) orally every 6 hours MDD:6 tablets  prednisoLONE: 40 milligram(s) orally  needs 2 nore doses post op for a reaction she had from doxycycline  Protonix 40 mg oral delayed release tablet: 1 tab(s) orally once a day   Singulair 10 mg oral tablet: 1 tab(s) orally once a day  Tylenol Extra Strength 500 mg oral tablet: 2 tab(s) orally 3 times a day   Ventolin HFA 90 mcg/inh inhalation aerosol: 2 puff(s) inhaled every 4 hours, As Needed  Vitamin D2 2000 intl units (50 mcg) oral capsule:    Calcium 600+D oral tablet:   Hyzaar 100 mg-12.5 mg oral tablet: 1 tab(s) orally once a day  Lescol 20 mg oral capsule: 1 cap(s) orally once a day  MiraLax oral powder for reconstitution: 17 gram(s) orally once a day, As Needed   Omega-3 oral capsule:   oxyCODONE 5 mg oral tablet: 1 tab(s) orally every 6 hours MDD:6 tablets  prednisoLONE: 40 milligram(s) orally  needs 2 nore doses post op for a reaction she had from doxycycline  Protonix 40 mg oral delayed release tablet: 1 tab(s) orally once a day   rivaroxaban 10 mg oral tablet: 1 tab by mouth by mouth daily as directed by  Services [phone] MDD:10mg  Singulair 10 mg oral tablet: 1 tab(s) orally once a day  Tylenol Extra Strength 500 mg oral tablet: 2 tab(s) orally 3 times a day   Ventolin HFA 90 mcg/inh inhalation aerosol: 2 puff(s) inhaled every 4 hours, As Needed  Vitamin D2 2000 intl units (50 mcg) oral capsule:

## 2021-04-16 NOTE — PHYSICAL THERAPY INITIAL EVALUATION ADULT - ADDITIONAL COMMENTS
Pt reports OA B thumbs and fingers  Pt able to transfer to rolling commode from bed w/ RW and min A, Rolled to bathroom,  R knee buckling with ambulation, applied KI for ambulation from bathroom  Pt reports having B Bakers Cysts that are an additional source of pain.

## 2021-04-16 NOTE — CONSULT NOTE ADULT - SUBJECTIVE AND OBJECTIVE BOX
PCP:  Dr. Gao  Ortho:  [facility]    CHIEF COMPLAINT:   left knee pain    HISTORY OF THE PRESENT ILLNESS:  75 F with Hx of osteoarthritis of the left knee with associated pain and difficulty walking.  She has experienced progressive pain along with trouble ambulating and now needs a cane.  She has difficulty walking up stairs and performing activities of daily living.  She has tried conservative therapies with steroid and hyaluronic acid injections which have not been effective.  She decided to undergo a left total knee replacement today.  She is no post-op in her room on 2N and the hospitalist service has been asked to assist with post-op medical consultation.    At this time, she denies,     PAST MEDICAL HISTORY:  HTN  Hyperlipidemia  Osteoathritis of knees  Microscopic Hematuria  Asthma  Chronic Low Back pain  Cervical Stenosis  Cyst in Liver    PAST SURGICAL HISTORY:  s/p ovarian cyst removal  s/p cataract surgery    FAMILY HISTORY:    Mother - Dementia  Father - Dementia    SOCIAL HISTORY:  no smoking, no alcohol, no drugs    REVIEW OF SYSTEMS:   All 10 systems reviewed in detailed and found to be negative with the exception of what has already been described above    MEDICATIONS  (STANDING):  acetaminophen   Tablet .. 975 milliGRAM(s) Oral every 8 hours  ascorbic acid 500 milliGRAM(s) Oral two times a day  aspirin 325 milliGRAM(s) Oral two times a day  atorvastatin 10 milliGRAM(s) Oral at bedtime  ceFAZolin   IVPB 2000 milliGRAM(s) IV Intermittent every 8 hours  ferrous    sulfate 325 milliGRAM(s) Oral daily  folic acid 1 milliGRAM(s) Oral daily  hydrochlorothiazide 12.5 milliGRAM(s) Oral daily  HYDROmorphone  Injectable 0.5 milliGRAM(s) SubCutaneous every 4 hours  losartan 100 milliGRAM(s) Oral daily  montelukast 10 milliGRAM(s) Oral daily  multivitamin 1 Tablet(s) Oral daily  pantoprazole    Tablet 40 milliGRAM(s) Oral before breakfast  polyethylene glycol 3350 17 Gram(s) Oral at bedtime  predniSONE   Tablet 40 milliGRAM(s) Oral daily  senna 2 Tablet(s) Oral at bedtime  sodium chloride 0.9% lock flush 3 milliLiter(s) IV Push every 8 hours    MEDICATIONS  (PRN):  ondansetron Injectable 8 milliGRAM(s) IV Push every 8 hours PRN Nausea and/or Vomiting  oxyCODONE    IR 5 milliGRAM(s) Oral every 4 hours PRN Mild Pain (1 - 3)  oxyCODONE    IR 10 milliGRAM(s) Oral every 4 hours PRN Moderate Pain (4 - 6)  prochlorperazine   Injectable 10 milliGRAM(s) IV Push every 8 hours PRN Nausea and/or Vomiting    VITALS:  T(F): 98.9 (04-16-21 @ 11:30), Max: 98.9 (04-16-21 @ 11:30)  HR: 53 (04-16-21 @ 11:30) (49 - 75)  BP: 111/41 (04-16-21 @ 11:30) (106/41 - 132/66)  RR: 19 (04-16-21 @ 11:30) (12 - 21)  SpO2: 98% (04-16-21 @ 11:30) (95% - 100%)  I&O's Summary    16 Apr 2021 07:01  -  16 Apr 2021 12:34  --------------------------------------------------------  IN: 1200 mL / OUT: 0 mL / NET: 1200 mL    CAPILLARY BLOOD GLUCOSE  POCT Blood Glucose.: 88 mg/dL (16 Apr 2021 09:37)  POCT Blood Glucose.: 91 mg/dL (16 Apr 2021 06:09)    PHYSICAL EXAM:  HEENT:  pupils equal and reactive, EOMI, no oropharyngeal lesions, erythema, exudates, oral thrush  NECK:   supple, no carotid bruits, no palpable lymph nodes, no thyromegaly  CV:  +S1, +S2, regular, no murmurs or rubs  RESP:   lungs clear to auscultation bilaterally, no wheezing, rales, rhonchi, good air entry bilaterally  BREAST:  not examined  GI:  abdomen soft, non-tender, non-distended, normal BS, no bruits, no abdominal masses, no palpable masses  RECTAL:  not examined  :  not examined  MSK:   normal muscle tone, no atrophy, no rigidity, no contractions  EXT:  no clubbing, no cyanosis, no edema, no calf pain, swelling or erythema  VASCULAR:  pulses equal and symmetric in the upper and lower extremities  NEURO:  AAOX3, no focal neurological deficits, follows all commands, able to move extremities spontaneously  SKIN:  no ulcers, lesions or rashes    LABS:                        12.4   11.13 )-----------( 253      ( 16 Apr 2021 11:08 )             39.7     04-16    141  |  108  |  22  ----------------------------<  81  3.3<L>   |  27  |  0.97    Ca    8.1<L>      16 Apr 2021 11:08    IMPRESSION:    S/P ELECTIVE LEFT KNEE REPLACEMENT FOR SEVER OSTEOARTHRITIS, POD #0, EBL ~150CC    ASTHMA - CONTROLLED    HYPERTENSION    HYPERLIPIDEMIA      RECOMMENDATIONS:  -  pain control  -  incentive spirometry  -  PT evaluation and treatment  -  complete cortes-operative ABXs  -  wound care per ortho  -  neurovascular checks           PCP:  Dr. Gao  Ortho:  [facility]    CHIEF COMPLAINT:   left knee pain    HISTORY OF THE PRESENT ILLNESS:  75 F with Hx of osteoarthritis of the left knee with associated pain and difficulty walking.  She has experienced progressive pain along with trouble ambulating and now needs a cane.  She has difficulty walking up stairs and performing activities of daily living.  She has tried conservative therapies with steroid and hyaluronic acid injections which have not been effective.  She decided to undergo a left total knee replacement today.  She is no post-op in her room on 2N and the hospitalist service has been asked to assist with post-op medical consultation.    At this time, she denies any chest pain, shortness of breath, nausea, vomiting, abdominal pain.    PAST MEDICAL HISTORY:  HTN  Hyperlipidemia  Osteoarthritis of knees  Microscopic Hematuria  Asthma  Chronic Low Back pain  Cervical Stenosis  Cyst in Liver  Osteoporosis    PAST SURGICAL HISTORY:  s/p ovarian cyst removal  s/p cataract surgery    FAMILY HISTORY:    Mother - Dementia  Father - Dementia    SOCIAL HISTORY:  no smoking, no alcohol, no drugs    REVIEW OF SYSTEMS:   All 10 systems reviewed in detailed and found to be negative with the exception of what has already been described above    MEDICATIONS  (STANDING):  acetaminophen   Tablet .. 975 milliGRAM(s) Oral every 8 hours  ascorbic acid 500 milliGRAM(s) Oral two times a day  aspirin 325 milliGRAM(s) Oral two times a day  atorvastatin 10 milliGRAM(s) Oral at bedtime  ceFAZolin   IVPB 2000 milliGRAM(s) IV Intermittent every 8 hours  ferrous    sulfate 325 milliGRAM(s) Oral daily  folic acid 1 milliGRAM(s) Oral daily  hydrochlorothiazide 12.5 milliGRAM(s) Oral daily  HYDROmorphone  Injectable 0.5 milliGRAM(s) SubCutaneous every 4 hours  losartan 100 milliGRAM(s) Oral daily  montelukast 10 milliGRAM(s) Oral daily  multivitamin 1 Tablet(s) Oral daily  pantoprazole    Tablet 40 milliGRAM(s) Oral before breakfast  polyethylene glycol 3350 17 Gram(s) Oral at bedtime  predniSONE   Tablet 40 milliGRAM(s) Oral daily  senna 2 Tablet(s) Oral at bedtime  sodium chloride 0.9% lock flush 3 milliLiter(s) IV Push every 8 hours    MEDICATIONS  (PRN):  ondansetron Injectable 8 milliGRAM(s) IV Push every 8 hours PRN Nausea and/or Vomiting  oxyCODONE    IR 5 milliGRAM(s) Oral every 4 hours PRN Mild Pain (1 - 3)  oxyCODONE    IR 10 milliGRAM(s) Oral every 4 hours PRN Moderate Pain (4 - 6)  prochlorperazine   Injectable 10 milliGRAM(s) IV Push every 8 hours PRN Nausea and/or Vomiting    VITALS:  T(F): 98.9 (04-16-21 @ 11:30), Max: 98.9 (04-16-21 @ 11:30)  HR: 53 (04-16-21 @ 11:30) (49 - 75)  BP: 111/41 (04-16-21 @ 11:30) (106/41 - 132/66)  RR: 19 (04-16-21 @ 11:30) (12 - 21)  SpO2: 98% (04-16-21 @ 11:30) (95% - 100%)  I&O's Summary    16 Apr 2021 07:01  -  16 Apr 2021 12:34  --------------------------------------------------------  IN: 1200 mL / OUT: 0 mL / NET: 1200 mL    CAPILLARY BLOOD GLUCOSE  POCT Blood Glucose.: 88 mg/dL (16 Apr 2021 09:37)  POCT Blood Glucose.: 91 mg/dL (16 Apr 2021 06:09)    PHYSICAL EXAM:  HEENT:  pupils equal and reactive, EOMI, no oropharyngeal lesions, erythema, exudates, oral thrush  NECK:   supple, no carotid bruits, no palpable lymph nodes, no thyromegaly  CV:  +S1, +S2, regular, no murmurs or rubs  RESP:   lungs clear to auscultation bilaterally, no wheezing, rales, rhonchi, good air entry bilaterally  BREAST:  not examined  GI:  abdomen soft, non-tender, non-distended, normal BS, no bruits, no abdominal masses, no palpable masses  RECTAL:  not examined  :  not examined  MSK:   normal muscle tone, no atrophy, no rigidity, no contractions  EXT:  no left knee swelling with dressing  VASCULAR:  pulses equal and symmetric in the upper and lower extremities  NEURO:  AAOX3, no focal neurological deficits, follows all commands, able to move extremities spontaneously  SKIN:  no ulcers, lesions or rashes    LABS:                        12.4   11.13 )-----------( 253      ( 16 Apr 2021 11:08 )             39.7     04-16    141  |  108  |  22  ----------------------------<  81  3.3<L>   |  27  |  0.97    Ca    8.1<L>      16 Apr 2021 11:08    IMPRESSION:    S/P ELECTIVE LEFT KNEE REPLACEMENT FOR SEVER OSTEOARTHRITIS, POD #0, EBL ~150CC    ASTHMA - CONTROLLED    HYPERTENSION - STABLE    HYPERLIPIDEMIA    HYPOKALEMIA (3.3)    RECENT ALLERGIC REACTION TO DOXYCYCLINE THIS WEEK RESULTING IN HOSPITALIZATION AT [place] FOR 2 DAYS, NOW ON PO PREDNISONE FOR 2 MORE DAYS (40 MG DAILY)      RECOMMENDATIONS:  -  pain control  -  incentive spirometry  -  PT evaluation and treatment  -  complete cortes-operative ABXs  -  wound care per ortho  -  neurovascular checks  -  resume home medications (patient will take her own Hyzaar, Lescol and Prednisone)  -  Prednisone 40mg today and tomorrow and then d/c  -  check labs in am  -  replete potassium  -  Albuterol PRN  -  DVT prophylaxis  -  anticoagulation consult  -  will follow with you    d/w patient and RN           PCP:  Dr. Gao  Ortho:  [facility]    CHIEF COMPLAINT:   left knee pain    HISTORY OF THE PRESENT ILLNESS:  75 F with Hx of osteoarthritis of the left knee with associated pain and difficulty walking.  She has experienced progressive pain along with trouble ambulating and now needs a cane.  She has difficulty walking up stairs and performing activities of daily living.  She has tried conservative therapies with steroid and hyaluronic acid injections which have not been effective.  She decided to undergo a left total knee replacement today.  She is no post-op in her room on 2N and the hospitalist service has been asked to assist with post-op medical consultation.    At this time, she denies any chest pain, shortness of breath, nausea, vomiting, abdominal pain.    PAST MEDICAL HISTORY:  HTN  Hyperlipidemia  Osteoarthritis of knees  Microscopic Hematuria  Asthma  Chronic Low Back pain  Cervical Stenosis  Cyst in Liver  Osteoporosis    PAST SURGICAL HISTORY:  s/p ovarian cyst removal  s/p cataract surgery    FAMILY HISTORY:    Mother - Dementia  Father - Dementia    SOCIAL HISTORY:  no smoking, no alcohol, no drugs    REVIEW OF SYSTEMS:   All 10 systems reviewed in detailed and found to be negative with the exception of what has already been described above    MEDICATIONS  (STANDING):  acetaminophen   Tablet .. 975 milliGRAM(s) Oral every 8 hours  ascorbic acid 500 milliGRAM(s) Oral two times a day  aspirin 325 milliGRAM(s) Oral two times a day  atorvastatin 10 milliGRAM(s) Oral at bedtime  ceFAZolin   IVPB 2000 milliGRAM(s) IV Intermittent every 8 hours  ferrous    sulfate 325 milliGRAM(s) Oral daily  folic acid 1 milliGRAM(s) Oral daily  hydrochlorothiazide 12.5 milliGRAM(s) Oral daily  HYDROmorphone  Injectable 0.5 milliGRAM(s) SubCutaneous every 4 hours  losartan 100 milliGRAM(s) Oral daily  montelukast 10 milliGRAM(s) Oral daily  multivitamin 1 Tablet(s) Oral daily  pantoprazole    Tablet 40 milliGRAM(s) Oral before breakfast  polyethylene glycol 3350 17 Gram(s) Oral at bedtime  predniSONE   Tablet 40 milliGRAM(s) Oral daily  senna 2 Tablet(s) Oral at bedtime  sodium chloride 0.9% lock flush 3 milliLiter(s) IV Push every 8 hours    MEDICATIONS  (PRN):  ondansetron Injectable 8 milliGRAM(s) IV Push every 8 hours PRN Nausea and/or Vomiting  oxyCODONE    IR 5 milliGRAM(s) Oral every 4 hours PRN Mild Pain (1 - 3)  oxyCODONE    IR 10 milliGRAM(s) Oral every 4 hours PRN Moderate Pain (4 - 6)  prochlorperazine   Injectable 10 milliGRAM(s) IV Push every 8 hours PRN Nausea and/or Vomiting    VITALS:  T(F): 98.9 (04-16-21 @ 11:30), Max: 98.9 (04-16-21 @ 11:30)  HR: 53 (04-16-21 @ 11:30) (49 - 75)  BP: 111/41 (04-16-21 @ 11:30) (106/41 - 132/66)  RR: 19 (04-16-21 @ 11:30) (12 - 21)  SpO2: 98% (04-16-21 @ 11:30) (95% - 100%)  I&O's Summary    16 Apr 2021 07:01  -  16 Apr 2021 12:34  --------------------------------------------------------  IN: 1200 mL / OUT: 0 mL / NET: 1200 mL    CAPILLARY BLOOD GLUCOSE  POCT Blood Glucose.: 88 mg/dL (16 Apr 2021 09:37)  POCT Blood Glucose.: 91 mg/dL (16 Apr 2021 06:09)    PHYSICAL EXAM:  HEENT:  pupils equal and reactive, EOMI, no oropharyngeal lesions, erythema, exudates, oral thrush  NECK:   supple, no carotid bruits, no palpable lymph nodes, no thyromegaly  CV:  +S1, +S2, regular, no murmurs or rubs  RESP:   lungs clear to auscultation bilaterally, no wheezing, rales, rhonchi, good air entry bilaterally  BREAST:  not examined  GI:  abdomen soft, non-tender, non-distended, normal BS, no bruits, no abdominal masses, no palpable masses  RECTAL:  not examined  :  not examined  MSK:   normal muscle tone, no atrophy, no rigidity, no contractions  EXT:  no left knee swelling with dressing  VASCULAR:  pulses equal and symmetric in the upper and lower extremities  NEURO:  AAOX3, no focal neurological deficits, follows all commands, able to move extremities spontaneously  SKIN:  no ulcers, lesions or rashes    LABS:                        12.4   11.13 )-----------( 253      ( 16 Apr 2021 11:08 )             39.7     04-16    141  |  108  |  22  ----------------------------<  81  3.3<L>   |  27  |  0.97    Ca    8.1<L>      16 Apr 2021 11:08    IMPRESSION:    S/P ELECTIVE LEFT KNEE REPLACEMENT FOR SEVER OSTEOARTHRITIS, POD #0, EBL ~150CC    ASTHMA - CONTROLLED    HYPERTENSION - STABLE    HYPERLIPIDEMIA    HYPOKALEMIA (3.3)    RECENT ALLERGIC REACTION TO DOXYCYCLINE THIS WEEK RESULTING IN HOSPITALIZATION AT [place] FOR 2 DAYS, NOW ON PO PREDNISONE FOR 2 MORE DAYS (40 MG DAILY)      RECOMMENDATIONS:  -  pain control  -  incentive spirometry  -  PT evaluation and treatment  -  complete cortes-operative ABXs  -  wound care per ortho  -  neurovascular checks  -  resume home medications (patient will take her own Hyzaar, Lescol and Prednisone)  -  Prednisone 40mg today and tomorrow and then d/c  -  check labs in am  -  change diet to low salt/low cholesterol  -  replete potassium  -  Albuterol PRN  -  DVT prophylaxis  -  anticoagulation consult  -  will follow with you    d/w patient and RN

## 2021-04-16 NOTE — PROGRESS NOTE ADULT - ASSESSMENT
A: 75F s/p Left TKA     P:  WBAT LLE   therapy   DVT ppx  post op abx  venodynes  incentive spirometer  pain control   follow labs   no pillow under knee

## 2021-04-16 NOTE — PROGRESS NOTE ADULT - SUBJECTIVE AND OBJECTIVE BOX
pt seen at bedside in PACU doing well, no complaints of pain to left knee, denies N/T LLE no other complaints    PE left knee  dressing c/d/i   compartmetns soft non tender  FROM ankle and toes  + EHL FHL GS TA   SILT   DP intact

## 2021-04-16 NOTE — PHYSICAL THERAPY INITIAL EVALUATION ADULT - LIVES WITH, PROFILE
Pt lives with  in a [facility] w/ 3 [facility] and resides on the main floor./spouse

## 2021-04-16 NOTE — PHYSICAL THERAPY INITIAL EVALUATION ADULT - MANUAL MUSCLE TESTING RESULTS, REHAB EVAL
except R knee NT/no strength deficits were identified except R knee NT. B hand  3/5 pain if resisted as per pt report/no strength deficits were identified

## 2021-04-16 NOTE — DISCHARGE NOTE PROVIDER - HOSPITAL COURSE
H&P:  Pt is a75y Female     Now s/p Left Total Knee Arthroplasty. Pt is afebrile with stable vital signs. Pain is controlled. Alert and Oriented. Exam reveals intact EHL FHL TA GS, +DP. Dressing is clean and dry with a new bandage on.  Primary osteoarthritis of left knee    FH: Alzheimers disease (Mother)    FH: hypertension (Mother)    HTN (hypertension)    HLD (hyperlipidemia)    Asthma    Seasonal allergies    Microscopic hematuria    Liver cyst    Arthritis    Lumbar herniated disc    Bakers cyst, left    DDD (degenerative disc disease), cervical    H/O ovarian cystectomy    History of cataract surgery    Hospital Course:  Patient presented to [facility] medically cleared for elective Left Knee Replacement Surgery, having failed outpatient conservative management. Prophylactic antibiotics were started before the procedure and continued for 24 hours. They were admitted after surgery to the orthopedic floor.   There were no complications during the hospital stay. All home medications were continued.     Routine consults were obtained from the Anticoagulation Team for DVT/PE prophylaxis, from Physical Therapy for twice daily PT, and from the Hospitalist for Medical Co-management. Patient was placed on anticoagulation.  Pertinent home medications were continued.  Daily labs were followed.      On POD 0 pt was stable overnight. No major events post op. Pt received twice daily PT and a new dressing was applied if needed prior to discharge. The plan is for DC to home with home PT. The orthopedic Attending is aware and agrees.

## 2021-04-16 NOTE — PHYSICAL THERAPY INITIAL EVALUATION ADULT - ACTIVE RANGE OF MOTION EXAMINATION, REHAB EVAL
except R knee NT/bilateral upper extremity Active ROM was WFL (within functional limits)/bilateral  lower extremity Active ROM was WFL (within functional limits)

## 2021-04-16 NOTE — CONSULT NOTE ADULT - SUBJECTIVE AND OBJECTIVE BOX
HPI:  Patient is a 75y old  Female who presents with a chief complaint of left knee pain, now s/p left total knee replacement 21.    Consulted by Dr. Stephenson for VTE prophylaxis, risk stratification, and anticoagulation management.    PAST MEDICAL & SURGICAL HISTORY:  HTN (hypertension)    HLD (hyperlipidemia)    Asthma  controlled; never intubated    Seasonal allergies    Microscopic hematuria  history    Liver cyst    Arthritis    Lumbar herniated disc    Bakers cyst, left    DDD (degenerative disc disease), cervical    H/O ovarian cystectomy   with appendectomy    History of cataract surgery  2019    Interval History:  21: Patient seen at bedside OOB to chair eating lunch. She denies any pain at this time, did get up with PT and walked to the bathroom with walker. She denies any history of VTE or bleeding. Discussed Xarelto with patient- she is amenable to it. She is allergic to yellow dye "lake" per patient- developed rash Advised that Xarelto is pink and does not contain this dye. She would prefer to go home upon discharge.    BMI: 32.1    CrCl: 58.4    Incision Time:814  Procedure End Time:936  EBL:150ml    Caprini VTE Risk Score:  CAPRINI SCORE  AGE RELATED RISK FACTORS                                                       MOBILITY RELATED FACTORS  [ ] Age 41-60 years                                            (1 Point)                  [ ] Bed rest                                                        (1 Point)  [ ] Age: 61-74 years                                           (2 Points)                [ ] Plaster cast                                                   (2 Points)  [x ] Age= 75 years                                              (3 Points)                 [ ] Bed bound for more than 72 hours                   (2 Points)    DISEASE RELATED RISK FACTORS                                               GENDER SPECIFIC FACTORS  [ ] Edema in the lower extremities                       (1 Point)           [ ] Pregnancy                                                            (1 Point)  [ ] Varicose veins                                               (1 Point)                  [ ] Post-partum < 6 weeks                                      (1 Point)             [x ] BMI > 25 Kg/m2                                            (1 Point)                  [ ] Hormonal therapy or oral contraception       (1 Point)                 [ ] Sepsis (in the previous month)                        (1 Point)             [ ] History of pregnancy complications                (1Point)  [ ] Pneumonia or serious lung disease                                             [ ] Unexplained or recurrent  (=/>3), premature                                 (In the previous month)                               (1 Point)                birth with toxemia or growth-restricted infant (1 Point)  [ ] Abnormal pulmonary function test            (1 Point)                                   SURGERY RELATED RISK FACTORS  [ ] Acute myocardial infarction                       (1 Point)                  [ ]  Section                                         (1 Point)  [ ] Congestive heart failure (in the previous month) (1 Point)   [ ] Minor surgery   lasting <45 minutes       (1 Point)   [ ] Inflammatory bowel disease                             (1 Point)          [ ] Arthroscopic surgery                                  (2 Points)  [ ] Central venous access                                    (2 Points)            [ ] General surgery lasting >45 minutes      (2 Points)       [ ] Stroke (in the previous month)                  (5 Points)            [ x] Elective major lower extremity arthroplasty (5 Points)                                   [  ] Malignancy (present or past include skin melanoma                                          but exclude  basal skin cell)    (2 points)                                      TRAUMA RELATED RISK FACTORS                HEMATOLOGY RELATED FACTORS                                  [ ] Fracture of the hip, pelvis, or leg                       (5 Points)  [ ] Prior episodes of VTE                                     (3 Points)          [ ] Acute spinal cord injury (in the previous month)  (5 Points)  [ ] Positive family history for VTE                         (3 Points)       [ ] Paralysis (less than 1 month)                          (5 Points)  [ ] Prothrombin 87723 A                                      (3 Points)         [ ] Multiple Trauma (within 1month)                 (5Points)                                                                                                                                                                [ ] Factor V Leiden                                          (3 Points)                                OTHER RISK FACTORS                          [ ] Lupus anticoagulants                                     (3 Points)                       [ ] BMI > 40                          (1 Point)                                                         [ ] Anticardiolipin antibodies                                (3 Points)                   [ ] Smoking                              (1Point)                                                [ ] High homocysteine in the blood                      (3 Points)                [  ] Diabetes requiring insulin (1point)                         [ ] Other congenital or acquired thrombophilia       (3 Points)          [  ] Chemotherapy                   (1 Point)  [ ] Heparin induced thrombocytopenia                  (3 Points)             [  ] Blood Transfusion                (1 point)                                                                                                             Total Score [    9      ]                                                                                                                                                                                                                                                                                                                                                                                                                                           IMPROVE Bleeding Risk Score:1.5      Falls Risk:   High ( x )  Mod (  )  Low (  )      FAMILY HISTORY:  FH: Alzheimers disease (Mother)    FH: hypertension (Mother)      Denies any personal or familial history of clotting or bleeding disorders.    Allergies    adhesives (Blisters; Rash)  Biaxin (Other)  Daypro (Diarrhea)  doxycycline (Short breath; Swelling)  latex (Blisters)  Levaquin (Other)  Lipitor (Other)  yellow dye number 5 lake (Anaphylaxis; Blisters; Hives)    Intolerances        REVIEW OF SYSTEMS    (  )Fever	        (  )Constipation	(  )SOB				  (  )Headache   (  )Dysuria  (  )Chills	        (  )Melena	        (  )Dyspnea on exertion (  )Dizziness    (  )Polyuria  (  )Nausea      (  )Hematochezia	(  )Cough                          (  )Syncope      (  )Hematuria  (  )Vomiting   (  )Chest Pain	        (  )Wheezing			  (  )Weakness  (  )Diarrhea    (  )Palpitations	(  )Anorexia			  ( x )Myalgia       ( x  ) Arthralgia    Pertinent positives in HPI and daily subjective. All other systems negative.    Vital Signs Last 24 Hrs  T(C): 36.6 (2021 12:13), Max: 37.2 (2021 11:30)  T(F): 97.8 (2021 12:13), Max: 98.9 (2021 11:30)  HR: 66 (2021 12:13) (49 - 75)  BP: 138/70 (2021 12:13) (106/41 - 138/70)  BP(mean): --  RR: 18 (2021 12:13) (12 - 21)  SpO2: 99% (:) (95% - 100%)      PHYSICAL EXAM:    Constitutional: Appears Well    Neurological: A& O x 3    Skin: Warm    Respiratory and Thorax: normal effort; Breath sounds: normal; No rales/wheezing/rhonchi  	  Cardiovascular: S1, S2, regular, NMBR	    Gastrointestinal: BS + x 4Q, nontender	    Genitourinary:  Bladder nondistended, nontender    Musculoskeletal:   General Right:   no muscle/joint tenderness,   normal tone, no joint swelling,   ROM: full	    General Left:   no muscle/joint tenderness,   normal tone, no joint swelling,   ROM: limited    Hip:  Right: Dressing CDI            Left: Dressing CDI      Knee: Left: Incision: ; Dressing CDI    Lower extrems:   Right: no calf tenderness              negative fernando's sign               + pedal pulses    Left:   no calf tenderness              negative fernando's sign               + pedal pulses                          12.4   11.13 )-----------( 253      ( 2021 11:08 )             39.7       04-16    141  |  108  |  22  ----------------------------<  81  3.3<L>   |  27  |  0.97    Ca    8.1<L>      2021 11:08    			    MEDICATIONS  (STANDING):  acetaminophen   Tablet .. 975 milliGRAM(s) Oral every 8 hours  ascorbic acid 500 milliGRAM(s) Oral two times a day  atorvastatin 10 milliGRAM(s) Oral at bedtime  ceFAZolin   IVPB 2000 milliGRAM(s) IV Intermittent every 8 hours  ferrous    sulfate 325 milliGRAM(s) Oral daily  fluvastatin 20 milliGRAM(s) Oral at bedtime  folic acid 1 milliGRAM(s) Oral daily  Hyzaar 100/12.5 1 Tablet(s)   Oral daily  montelukast 10 milliGRAM(s) Oral daily  multivitamin 1 Tablet(s) Oral daily  pantoprazole    Tablet 40 milliGRAM(s) Oral before breakfast  polyethylene glycol 3350 17 Gram(s) Oral at bedtime  potassium chloride    Tablet ER 40 milliEquivalent(s) Oral once  predniSONE   Tablet 40 milliGRAM(s) Oral daily  rivaroxaban 10 milliGRAM(s) Oral <User Schedule>  senna 2 Tablet(s) Oral at bedtime  sodium chloride 0.9% lock flush 3 milliLiter(s) IV Push every 8 hours            **Current DVT Prophylaxis:    LMWH                   (  )  Heparin SQ           (  )  Coumadin             (  )  Xarelto                  ( x )  Eliquis                   (  )  Venodynes           ( x )  Ambulation          ( x )  UFH                       (  )  ECASA                   (  )  Contraindicated  (  )

## 2021-04-16 NOTE — PHYSICAL THERAPY INITIAL EVALUATION ADULT - MODALITIES TREATMENT COMMENTS
Pt OOB in chair to finish lunch R knee elevated on cushioned stool, B SCD's, +alarm, CBIR, KI donned only for ambulation today due to nerve block not fully worn off yet, taken off at end of ambulation.

## 2021-04-16 NOTE — DISCHARGE NOTE PROVIDER - NSDCFUADDINST_GEN_ALL_CORE_FT
Discharge Instructions for Total Knee Arthroplasty:    1. ACTIVITY: WBAT, Rolling walker, Daily PT. Gentle ROM 0-full as tolerated. Walk plenty.  Knee Immobilizer at night time only for 3 weeks.  2. CALL FOR: fever over 101, wound redness, drainage or open area, calf pain/calf swelling.  3. BANDAGE: Remove BONNIE NO SOONER than POD7 (4/23) and place a Mepilex Ag bandage over incision. May change sooner ONLY if BONNIE leaks or falls off. DO NOT REMOVE BANDAGE TO CHECK WOUND ON INTAKE.  4. SHOWER: Can shower after POD7.  5. STAPLES: RN Remove Staples POD14 (4/30).  6. DVT PE PROPHYLAXIS: Managed by Anticoag Team. See Med Rec.  7. GI: Continue Protonix daily while on Anticoagulant. an eRx has been sent to your pharmacy.  8. LABS:  INR if on Coumadin.  9. FOLLOW UP: Dr. Stephenson in 1 month. Call to schedule.  10. MEDICATIONS:  If going home, eRX sent to your pharmacy for .   11.**Call office if medications not covered under your insurance, especially BLOOD CLOT PREVENTION/anticoagulant medication.

## 2021-04-16 NOTE — CONSULT NOTE ADULT - ASSESSMENT
This is a 75 year old female s/p left total knee replacement with high risk for VTE due to age, BMI, impaired mobility, surgery. She is low risk for bleeding.    Discussed Xarelto and the risks and benefits with patient. Emphasized the importance of taking medication daily to prevent VTE. Verbalized understanding and is in agreement with treatment plan.    Plan:  ::Xarelto 10 mg PO daily x 12 days starting tonight @ 6pm with last dose ---> 4/27  ::PPI- Protonix 40 mg PO daily   ::Daily CBC/BMP  ::Enc ambulation  ::Papi    Thank you for this consult, will continue to follow.  Dispo: Home  Script sent to pharmacy

## 2021-04-17 VITALS
DIASTOLIC BLOOD PRESSURE: 70 MMHG | HEART RATE: 106 BPM | OXYGEN SATURATION: 100 % | RESPIRATION RATE: 16 BRPM | SYSTOLIC BLOOD PRESSURE: 105 MMHG | TEMPERATURE: 98 F

## 2021-04-17 LAB
ANION GAP SERPL CALC-SCNC: 6 MMOL/L — SIGNIFICANT CHANGE UP (ref 5–17)
BUN SERPL-MCNC: 27 MG/DL — HIGH (ref 7–23)
CALCIUM SERPL-MCNC: 8 MG/DL — LOW (ref 8.5–10.1)
CHLORIDE SERPL-SCNC: 106 MMOL/L — SIGNIFICANT CHANGE UP (ref 96–108)
CO2 SERPL-SCNC: 25 MMOL/L — SIGNIFICANT CHANGE UP (ref 22–31)
COVID-19 SPIKE DOMAIN AB INTERP: POSITIVE
COVID-19 SPIKE DOMAIN ANTIBODY RESULT: >250 U/ML — HIGH
CREAT SERPL-MCNC: 1.18 MG/DL — SIGNIFICANT CHANGE UP (ref 0.5–1.3)
GLUCOSE SERPL-MCNC: 104 MG/DL — HIGH (ref 70–99)
HCT VFR BLD CALC: 39.1 % — SIGNIFICANT CHANGE UP (ref 34.5–45)
HGB BLD-MCNC: 12.4 G/DL — SIGNIFICANT CHANGE UP (ref 11.5–15.5)
MCHC RBC-ENTMCNC: 29 PG — SIGNIFICANT CHANGE UP (ref 27–34)
MCHC RBC-ENTMCNC: 31.7 GM/DL — LOW (ref 32–36)
MCV RBC AUTO: 91.6 FL — SIGNIFICANT CHANGE UP (ref 80–100)
PLATELET # BLD AUTO: 302 K/UL — SIGNIFICANT CHANGE UP (ref 150–400)
POTASSIUM SERPL-MCNC: 4 MMOL/L — SIGNIFICANT CHANGE UP (ref 3.5–5.3)
POTASSIUM SERPL-SCNC: 4 MMOL/L — SIGNIFICANT CHANGE UP (ref 3.5–5.3)
RBC # BLD: 4.27 M/UL — SIGNIFICANT CHANGE UP (ref 3.8–5.2)
RBC # FLD: 13.1 % — SIGNIFICANT CHANGE UP (ref 10.3–14.5)
SARS-COV-2 IGG+IGM SERPL QL IA: >250 U/ML — HIGH
SARS-COV-2 IGG+IGM SERPL QL IA: POSITIVE
SODIUM SERPL-SCNC: 137 MMOL/L — SIGNIFICANT CHANGE UP (ref 135–145)
WBC # BLD: 16.3 K/UL — HIGH (ref 3.8–10.5)
WBC # FLD AUTO: 16.3 K/UL — HIGH (ref 3.8–10.5)

## 2021-04-17 PROCEDURE — 99231 SBSQ HOSP IP/OBS SF/LOW 25: CPT

## 2021-04-17 PROCEDURE — 99232 SBSQ HOSP IP/OBS MODERATE 35: CPT

## 2021-04-17 RX ADMIN — Medication 40 MILLIGRAM(S): at 12:28

## 2021-04-17 RX ADMIN — Medication 100 MILLIGRAM(S): at 00:27

## 2021-04-17 RX ADMIN — PANTOPRAZOLE SODIUM 40 MILLIGRAM(S): 20 TABLET, DELAYED RELEASE ORAL at 06:30

## 2021-04-17 RX ADMIN — Medication 975 MILLIGRAM(S): at 14:03

## 2021-04-17 RX ADMIN — OXYCODONE HYDROCHLORIDE 10 MILLIGRAM(S): 5 TABLET ORAL at 07:25

## 2021-04-17 RX ADMIN — MONTELUKAST 10 MILLIGRAM(S): 4 TABLET, CHEWABLE ORAL at 11:02

## 2021-04-17 RX ADMIN — Medication 325 MILLIGRAM(S): at 11:02

## 2021-04-17 RX ADMIN — OXYCODONE HYDROCHLORIDE 10 MILLIGRAM(S): 5 TABLET ORAL at 06:29

## 2021-04-17 RX ADMIN — Medication 1 MILLIGRAM(S): at 11:02

## 2021-04-17 RX ADMIN — CELECOXIB 200 MILLIGRAM(S): 200 CAPSULE ORAL at 11:02

## 2021-04-17 RX ADMIN — Medication 500 MILLIGRAM(S): at 11:02

## 2021-04-17 RX ADMIN — ONDANSETRON 8 MILLIGRAM(S): 8 TABLET, FILM COATED ORAL at 06:29

## 2021-04-17 RX ADMIN — SODIUM CHLORIDE 3 MILLILITER(S): 9 INJECTION INTRAMUSCULAR; INTRAVENOUS; SUBCUTANEOUS at 06:09

## 2021-04-17 RX ADMIN — CELECOXIB 200 MILLIGRAM(S): 200 CAPSULE ORAL at 11:04

## 2021-04-17 RX ADMIN — OXYCODONE HYDROCHLORIDE 10 MILLIGRAM(S): 5 TABLET ORAL at 12:00

## 2021-04-17 RX ADMIN — OXYCODONE HYDROCHLORIDE 10 MILLIGRAM(S): 5 TABLET ORAL at 11:02

## 2021-04-17 RX ADMIN — Medication 1 TABLET(S): at 11:02

## 2021-04-17 NOTE — PROGRESS NOTE ADULT - SUBJECTIVE AND OBJECTIVE BOX
HPI:  Patient is a 75y old  Female who presents with a chief complaint of left knee pain, now s/p left total knee replacement 21.    Consulted by Dr. Stephenson for VTE prophylaxis, risk stratification, and anticoagulation management.    Interval History:  21: Patient seen at bedside OOB to chair eating lunch. She denies any pain at this time, did get up with PT and walked to the bathroom with walker. She denies any history of VTE or bleeding. Discussed Xarelto with patient- she is amenable to it. She is allergic to yellow dye "lake" per patient- developed rash Advised that Xarelto is pink and does not contain this dye. She would prefer to go home upon discharge.  21:  pt see at bedside, eating breakfast, reiterated need for Xarelto, agreeable, has no concerns, denies any CP or SOB  BMI: 32.1    CrCl: 58.4    Incision Time:0814  Procedure End Time:936  EBL:150ml    Caprini VTE Risk Score:  CAPRINI SCORE  AGE RELATED RISK FACTORS                                                       MOBILITY RELATED FACTORS  [ ] Age 41-60 years                                            (1 Point)                  [ ] Bed rest                                                        (1 Point)  [ ] Age: 61-74 years                                           (2 Points)                [ ] Plaster cast                                                   (2 Points)  [x ] Age= 75 years                                              (3 Points)                 [ ] Bed bound for more than 72 hours                   (2 Points)    DISEASE RELATED RISK FACTORS                                               GENDER SPECIFIC FACTORS  [ ] Edema in the lower extremities                       (1 Point)           [ ] Pregnancy                                                            (1 Point)  [ ] Varicose veins                                               (1 Point)                  [ ] Post-partum < 6 weeks                                      (1 Point)             [x ] BMI > 25 Kg/m2                                            (1 Point)                  [ ] Hormonal therapy or oral contraception       (1 Point)                 [ ] Sepsis (in the previous month)                        (1 Point)             [ ] History of pregnancy complications                (1Point)  [ ] Pneumonia or serious lung disease                                             [ ] Unexplained or recurrent  (=/>3), premature                                 (In the previous month)                               (1 Point)                birth with toxemia or growth-restricted infant (1 Point)  [ ] Abnormal pulmonary function test            (1 Point)                                   SURGERY RELATED RISK FACTORS  [ ] Acute myocardial infarction                       (1 Point)                  [ ]  Section                                         (1 Point)  [ ] Congestive heart failure (in the previous month) (1 Point)   [ ] Minor surgery   lasting <45 minutes       (1 Point)   [ ] Inflammatory bowel disease                             (1 Point)          [ ] Arthroscopic surgery                                  (2 Points)  [ ] Central venous access                                    (2 Points)            [ ] General surgery lasting >45 minutes      (2 Points)       [ ] Stroke (in the previous month)                  (5 Points)            [ x] Elective major lower extremity arthroplasty (5 Points)                                   [  ] Malignancy (present or past include skin melanoma                                          but exclude  basal skin cell)    (2 points)                                      TRAUMA RELATED RISK FACTORS                HEMATOLOGY RELATED FACTORS                                  [ ] Fracture of the hip, pelvis, or leg                       (5 Points)  [ ] Prior episodes of VTE                                     (3 Points)          [ ] Acute spinal cord injury (in the previous month)  (5 Points)  [ ] Positive family history for VTE                         (3 Points)       [ ] Paralysis (less than 1 month)                          (5 Points)  [ ] Prothrombin 37465 A                                      (3 Points)         [ ] Multiple Trauma (within 1month)                 (5Points)                                                                                                                                                                [ ] Factor V Leiden                                          (3 Points)                                OTHER RISK FACTORS                          [ ] Lupus anticoagulants                                     (3 Points)                       [ ] BMI > 40                          (1 Point)                                                         [ ] Anticardiolipin antibodies                                (3 Points)                   [ ] Smoking                              (1Point)                                                [ ] High homocysteine in the blood                      (3 Points)                [  ] Diabetes requiring insulin (1point)                         [ ] Other congenital or acquired thrombophilia       (3 Points)          [  ] Chemotherapy                   (1 Point)  [ ] Heparin induced thrombocytopenia                  (3 Points)             [  ] Blood Transfusion                (1 point)                                                                                                             Total Score [    9      ]                                                                                                                                                                                                                                                                                                                                                                                                                                           IMPROVE Bleeding Risk Score:1.5      Falls Risk:   High ( x )  Mod (  )  Low (  )        REVIEW OF SYSTEMS    (  )Fever	        (  )Constipation	(  )SOB				  (  )Headache   (  )Dysuria  (  )Chills	        (  )Melena	        (  )Dyspnea on exertion (  )Dizziness    (  )Polyuria  (  )Nausea      (  )Hematochezia	(  )Cough                          (  )Syncope      (  )Hematuria  (  )Vomiting   (  )Chest Pain	        (  )Wheezing			  (  )Weakness  (  )Diarrhea    (  )Palpitations	(  )Anorexia			  ( x )Myalgia       ( x  ) Arthralgia    Pertinent positives in HPI and daily subjective. All other systems negative.    Vital Signs Last 24 Hrs  T(C): 36.6 (2021 12:13), Max: 37.2 (2021 11:30)  T(F): 97.8 (2021 12:13), Max: 98.9 (2021 11:30)  HR: 66 (2021 12:13) (49 - 75)  BP: 138/70 (2021 12:13) (106/41 - 138/70)  BP(mean): --  RR: 18 (2021 12:13) (12 - 21)  SpO2: 99% (2021 12:13) (95% - 100%)      PHYSICAL EXAM:    Constitutional: Appears Well    Neurological: A& O x 3    Skin: Warm    Respiratory and Thorax: normal effort; Breath sounds: normal; No rales/wheezing/rhonchi  	  Cardiovascular: S1, S2, regular, NMBR	    Gastrointestinal: BS + x 4Q, nontender	    Genitourinary:  Bladder nondistended, nontender    Musculoskeletal:   General Right:   no muscle/joint tenderness,   normal tone, no joint swelling,   ROM: full	    General Left:   no muscle/joint tenderness,   normal tone, no joint swelling,   ROM: limited    Hip:  Right: Dressing CDI            Left: Dressing CDI      Knee: Left: Incision: ; Dressing CDI    Lower extrems:   Right: no calf tenderness              negative fernando's sign               + pedal pulses    Left:   no calf tenderness              negative fernando's sign               + pedal pulses                          12.4   11.13 )-----------( 253      ( 2021 11:08 )             39.7       04-16    141  |  108  |  22  ----------------------------<  81  3.3<L>   |  27  |  0.97    Ca    8.1<L>      2021 11:08    			    MEDICATIONS  (STANDING):  acetaminophen   Tablet .. 975 milliGRAM(s) Oral every 8 hours  ascorbic acid 500 milliGRAM(s) Oral two times a day  atorvastatin 10 milliGRAM(s) Oral at bedtime  ceFAZolin   IVPB 2000 milliGRAM(s) IV Intermittent every 8 hours  ferrous    sulfate 325 milliGRAM(s) Oral daily  fluvastatin 20 milliGRAM(s) Oral at bedtime  folic acid 1 milliGRAM(s) Oral daily  Hyzaar 100/12.5 1 Tablet(s)   Oral daily  montelukast 10 milliGRAM(s) Oral daily  multivitamin 1 Tablet(s) Oral daily  pantoprazole    Tablet 40 milliGRAM(s) Oral before breakfast  polyethylene glycol 3350 17 Gram(s) Oral at bedtime  potassium chloride    Tablet ER 40 milliEquivalent(s) Oral once  predniSONE   Tablet 40 milliGRAM(s) Oral daily  rivaroxaban 10 milliGRAM(s) Oral <User Schedule>  senna 2 Tablet(s) Oral at bedtime  sodium chloride 0.9% lock flush 3 milliLiter(s) IV Push every 8 hours            **Current DVT Prophylaxis:    LMWH                   (  )  Heparin SQ           (  )  Coumadin             (  )  Xarelto                  ( x )  Eliquis                   (  )  Venodynes           ( x )  Ambulation          ( x )  UFH                       (  )  ECASA                   (  )  Contraindicated  (  )           HPI:  Patient is a 75y old  Female who presents with a chief complaint of left knee pain, now s/p left total knee replacement 21.    Consulted by Dr. Stephenson for VTE prophylaxis, risk stratification, and anticoagulation management.    Interval History:  21: Patient seen at bedside OOB to chair eating lunch. She denies any pain at this time, did get up with PT and walked to the bathroom with walker. She denies any history of VTE or bleeding. Discussed Xarelto with patient- she is amenable to it. She is allergic to yellow dye "lake" per patient- developed rash Advised that Xarelto is pink and does not contain this dye. She would prefer to go home upon discharge.  21:  pt see at bedside, eating breakfast, reiterated need for Xarelto, agreeable, has no concerns, denies any CP or SOB  BMI: 32.1    CrCl: 58.4    Incision Time:0814  Procedure End Time:936  EBL:150ml    Caprini VTE Risk Score:  CAPRINI SCORE  AGE RELATED RISK FACTORS                                                       MOBILITY RELATED FACTORS  [ ] Age 41-60 years                                            (1 Point)                  [ ] Bed rest                                                        (1 Point)  [ ] Age: 61-74 years                                           (2 Points)                [ ] Plaster cast                                                   (2 Points)  [x ] Age= 75 years                                              (3 Points)                 [ ] Bed bound for more than 72 hours                   (2 Points)    DISEASE RELATED RISK FACTORS                                               GENDER SPECIFIC FACTORS  [ ] Edema in the lower extremities                       (1 Point)           [ ] Pregnancy                                                            (1 Point)  [ ] Varicose veins                                               (1 Point)                  [ ] Post-partum < 6 weeks                                      (1 Point)             [x ] BMI > 25 Kg/m2                                            (1 Point)                  [ ] Hormonal therapy or oral contraception       (1 Point)                 [ ] Sepsis (in the previous month)                        (1 Point)             [ ] History of pregnancy complications                (1Point)  [ ] Pneumonia or serious lung disease                                             [ ] Unexplained or recurrent  (=/>3), premature                                 (In the previous month)                               (1 Point)                birth with toxemia or growth-restricted infant (1 Point)  [ ] Abnormal pulmonary function test            (1 Point)                                   SURGERY RELATED RISK FACTORS  [ ] Acute myocardial infarction                       (1 Point)                  [ ]  Section                                         (1 Point)  [ ] Congestive heart failure (in the previous month) (1 Point)   [ ] Minor surgery   lasting <45 minutes       (1 Point)   [ ] Inflammatory bowel disease                             (1 Point)          [ ] Arthroscopic surgery                                  (2 Points)  [ ] Central venous access                                    (2 Points)            [ ] General surgery lasting >45 minutes      (2 Points)       [ ] Stroke (in the previous month)                  (5 Points)            [ x] Elective major lower extremity arthroplasty (5 Points)                                   [  ] Malignancy (present or past include skin melanoma                                          but exclude  basal skin cell)    (2 points)                                      TRAUMA RELATED RISK FACTORS                HEMATOLOGY RELATED FACTORS                                  [ ] Fracture of the hip, pelvis, or leg                       (5 Points)  [ ] Prior episodes of VTE                                     (3 Points)          [ ] Acute spinal cord injury (in the previous month)  (5 Points)  [ ] Positive family history for VTE                         (3 Points)       [ ] Paralysis (less than 1 month)                          (5 Points)  [ ] Prothrombin 85380 A                                      (3 Points)         [ ] Multiple Trauma (within 1month)                 (5Points)                                                                                                                                                                [ ] Factor V Leiden                                          (3 Points)                                OTHER RISK FACTORS                          [ ] Lupus anticoagulants                                     (3 Points)                       [ ] BMI > 40                          (1 Point)                                                         [ ] Anticardiolipin antibodies                                (3 Points)                   [ ] Smoking                              (1Point)                                                [ ] High homocysteine in the blood                      (3 Points)                [  ] Diabetes requiring insulin (1point)                         [ ] Other congenital or acquired thrombophilia       (3 Points)          [  ] Chemotherapy                   (1 Point)  [ ] Heparin induced thrombocytopenia                  (3 Points)             [  ] Blood Transfusion                (1 point)                                                                                                             Total Score [    9      ]                                                                                                                                                                                                                                                                                                                                                                                                                                           IMPROVE Bleeding Risk Score:1.5      Falls Risk:   High ( x )  Mod (  )  Low (  )        REVIEW OF SYSTEMS    (  )Fever	        (  )Constipation	(  )SOB				  (  )Headache   (  )Dysuria  (  )Chills	        (  )Melena	        (  )Dyspnea on exertion (  )Dizziness    (  )Polyuria  (  )Nausea      (  )Hematochezia	(  )Cough                          (  )Syncope      (  )Hematuria  (  )Vomiting   (  )Chest Pain	        (  )Wheezing			  (  )Weakness  (  )Diarrhea    (  )Palpitations	(  )Anorexia			  ( x )Myalgia       ( x  ) Arthralgia    Pertinent positives in HPI and daily subjective. All other systems negative.    Vital Signs Last 24 Hrs  T(C): 36.6 (2021 12:13), Max: 37.2 (2021 11:30)  T(F): 97.8 (2021 12:13), Max: 98.9 (2021 11:30)  HR: 66 (2021 12:13) (49 - 75)  BP: 138/70 (2021 12:13) (106/41 - 138/70)  BP(mean): --  RR: 18 (2021 12:13) (12 - 21)  SpO2: 99% (2021 12:13) (95% - 100%)      PHYSICAL EXAM:    Constitutional: Appears Well    Neurological: A& O x 3    Skin: Warm    Respiratory and Thorax: normal effort; Breath sounds: normal; No rales/wheezing/rhonchi  	  Cardiovascular: S1, S2, regular, NMBR	    Gastrointestinal: BS + x 4Q, nontender	    Genitourinary:  Bladder nondistended, nontender    Musculoskeletal:   General Right:   no muscle/joint tenderness,   normal tone, no joint swelling,   ROM: full	    General Left:   no muscle/joint tenderness,   normal tone, no joint swelling,   ROM: limited    Hip:  Right: Dressing CDI            Left: Dressing CDI      Knee: Left: Incision: ; Dressing CDI    Lower extrems:   Right: no calf tenderness              negative fernando's sign               + pedal pulses    Left:   no calf tenderness              negative fernando's sign               + pedal pulses                          12.4   11.13 )-----------( 253      ( 2021 11:08 )             39.7       04-16    141  |  108  |  22  ----------------------------<  81  3.3<L>   |  27  |  0.97    Ca    8.1<L>      2021 11:08    			  MEDICATIONS  (STANDING):  acetaminophen   Tablet .. 975 milliGRAM(s) Oral every 8 hours  ascorbic acid 500 milliGRAM(s) Oral two times a day  celecoxib 200 milliGRAM(s) Oral every 12 hours  ferrous    sulfate 325 milliGRAM(s) Oral daily  fluvastatin 20 milliGRAM(s) Oral at bedtime  folic acid 1 milliGRAM(s) Oral daily  Hyzaar 100/12.5 1 Tablet(s) 1 Tablet(s) Oral daily  lactated ringers. 1000 milliLiter(s) (75 mL/Hr) IV Continuous <Continuous>  montelukast 10 milliGRAM(s) Oral daily  multivitamin 1 Tablet(s) Oral daily  pantoprazole    Tablet 40 milliGRAM(s) Oral before breakfast  polyethylene glycol 3350 17 Gram(s) Oral at bedtime  predniSONE   Tablet 40 milliGRAM(s) Oral daily  rivaroxaban 10 milliGRAM(s) Oral <User Schedule>  senna 2 Tablet(s) Oral at bedtime  sodium chloride 0.9% lock flush 3 milliLiter(s) IV Push every 8 hours    MEDICATIONS  (PRN):  ALBUTerol  90 MICROgram(s) HFA Inhaler - Peds 2 Puff(s) Inhalation every 4 hours PRN Shortness of Breath and/or Wheezing  HYDROmorphone  Injectable 0.5 milliGRAM(s) SubCutaneous every 4 hours PRN Severe Pain (7 - 10)  ondansetron Injectable 8 milliGRAM(s) IV Push every 8 hours PRN Nausea and/or Vomiting  oxyCODONE    IR 5 milliGRAM(s) Oral every 4 hours PRN Mild Pain (1 - 3)  oxyCODONE    IR 10 milliGRAM(s) Oral every 4 hours PRN Moderate Pain (4 - 6)  prochlorperazine   Injectable 10 milliGRAM(s) IV Push every 8 hours PRN Nausea and/or Vomiting          **Current DVT Prophylaxis:    LMWH                   (  )  Heparin SQ           (  )  Coumadin             (  )  Xarelto                  ( x )  Eliquis                   (  )  Venodynes           ( x )  Ambulation          ( x )  UFH                       (  )  ECASA                   (  )  Contraindicated  (  )

## 2021-04-17 NOTE — PROGRESS NOTE ADULT - ASSESSMENT
A: 75F s/p Left TKA POD 1    P:  WBAT LLE   therapy   DVT ppx  finish steroid doses  post op abx  venodynes  incentive spirometer  pain control   follow labs   no pillow under knee   dispo planning to home

## 2021-04-17 NOTE — PROGRESS NOTE ADULT - SUBJECTIVE AND OBJECTIVE BOX
PCP:  Dr. Gao  Ortho:  [facility]    CHIEF COMPLAINT:   left knee pain    HISTORY OF THE PRESENT ILLNESS:  75 F with Hx of osteoarthritis of the left knee with associated pain and difficulty walking.  She has experienced progressive pain along with trouble ambulating and now needs a cane.  She has difficulty walking up stairs and performing activities of daily living.  She has tried conservative therapies with steroid and hyaluronic acid injections which have not been effective.  She decided to undergo a left total knee replacement today.  She is no post-op in her room on 2N and the hospitalist service has been asked to assist with post-op medical consultation.  At this time, she denies any chest pain, shortness of breath, nausea, vomiting, abdominal pain.    4/17/21- doing good, ambulated with PT. Wants to go home today    PAST MEDICAL HISTORY:  HTN  Hyperlipidemia  Osteoarthritis of knees  Microscopic Hematuria  Asthma  Chronic Low Back pain  Cervical Stenosis  Cyst in Liver  Osteoporosis    PAST SURGICAL HISTORY:  s/p ovarian cyst removal  s/p cataract surgery    FAMILY HISTORY:    Mother - Dementia  Father - Dementia    SOCIAL HISTORY:  no smoking, no alcohol, no drugs    REVIEW OF SYSTEMS:   All 10 systems reviewed in detailed and found to be negative with the exception of what has already been described above    MEDICATIONS  (STANDING):  MEDICATIONS  (STANDING):  acetaminophen   Tablet .. 975 milliGRAM(s) Oral every 8 hours  ascorbic acid 500 milliGRAM(s) Oral two times a day  celecoxib 200 milliGRAM(s) Oral every 12 hours  ferrous    sulfate 325 milliGRAM(s) Oral daily  fluvastatin 20 milliGRAM(s) Oral at bedtime  folic acid 1 milliGRAM(s) Oral daily  Hyzaar 100/12.5 1 Tablet(s) 1 Tablet(s) Oral daily  lactated ringers. 1000 milliLiter(s) (75 mL/Hr) IV Continuous <Continuous>  montelukast 10 milliGRAM(s) Oral daily  multivitamin 1 Tablet(s) Oral daily  pantoprazole    Tablet 40 milliGRAM(s) Oral before breakfast  polyethylene glycol 3350 17 Gram(s) Oral at bedtime  predniSONE   Tablet 40 milliGRAM(s) Oral daily  rivaroxaban 10 milliGRAM(s) Oral <User Schedule>  senna 2 Tablet(s) Oral at bedtime  sodium chloride 0.9% lock flush 3 milliLiter(s) IV Push every 8 hours    MEDICATIONS  (PRN):  MEDICATIONS  (PRN):  ALBUTerol  90 MICROgram(s) HFA Inhaler - Peds 2 Puff(s) Inhalation every 4 hours PRN Shortness of Breath and/or Wheezing  HYDROmorphone  Injectable 0.5 milliGRAM(s) SubCutaneous every 4 hours PRN Severe Pain (7 - 10)  ondansetron Injectable 8 milliGRAM(s) IV Push every 8 hours PRN Nausea and/or Vomiting  oxyCODONE    IR 5 milliGRAM(s) Oral every 4 hours PRN Mild Pain (1 - 3)  oxyCODONE    IR 10 milliGRAM(s) Oral every 4 hours PRN Moderate Pain (4 - 6)  prochlorperazine   Injectable 10 milliGRAM(s) IV Push every 8 hours PRN Nausea and/or Vomiting    VITALS:  Vital Signs Last 24 Hrs  T(C): 37.7 (17 Apr 2021 09:41), Max: 37.7 (17 Apr 2021 09:41)  T(F): 99.8 (17 Apr 2021 09:41), Max: 99.8 (17 Apr 2021 09:41)  HR: 120 (17 Apr 2021 09:41) (68 - 120)  BP: 122/63 (17 Apr 2021 09:41) (120/51 - 146/69)  BP(mean): 77 (17 Apr 2021 09:41) (77 - 77)  RR: 16 (17 Apr 2021 09:41) (16 - 18)  SpO2: 96% (17 Apr 2021 09:41) (93% - 100%)    PHYSICAL EXAM:  HEENT:  pupils equal and reactive, EOMI, no oropharyngeal lesions, erythema, exudates, oral thrush  NECK:   supple, no carotid bruits, no palpable lymph nodes, no thyromegaly  CV:  +S1, +S2, regular, no murmurs or rubs  RESP:   lungs clear to auscultation bilaterally, no wheezing, rales, rhonchi, good air entry bilaterally  GI:  abdomen soft, non-tender, non-distended, normal BS, no bruits, no abdominal masses, no palpable masses  MSK:   normal muscle tone, no atrophy, no rigidity, no contractions  EXT:  no left knee swelling with dressing, dressing dry  VASCULAR:  pulses equal and symmetric in the upper and lower extremities  NEURO:  AAOX3, no focal neurological deficits, follows all commands, able to move extremities spontaneously  SKIN:  no ulcers, lesions or rashes    LABS:                                   12.4   16.30 )-----------( 302      ( 17 Apr 2021 06:34 )             39.1     17 Apr 2021 06:34    137    |  106    |  27     ----------------------------<  104    4.0     |  25     |  1.18     Ca    8.0        17 Apr 2021 06:34    CAPILLARY BLOOD GLUCOSE  POCT Blood Glucose.: 162 mg/dL (16 Apr 2021 21:39)    IMPRESSION:    S/P ELECTIVE LEFT KNEE REPLACEMENT FOR SEVER OSTEOARTHRITIS  ASTHMA - CONTROLLED  HYPERTENSION - STABLE  HYPERLIPIDEMIA  HYPOKALEMIA (3.3)  RECENT ALLERGIC REACTION TO DOXYCYCLINE THIS WEEK RESULTING IN HOSPITALIZATION AT [place] FOR 2 DAYS, NOW ON PO PREDNISONE FOR 2 MORE DAYS (40 MG DAILY)      RECOMMENDATIONS:  Ortho f/u appreciated  PT as tolerated  Monitor HH  Pain meds prn  AC by xarelto  Bowel regimen  Incentive spirometry  Completed PO steroids    #Dispo- continue to mobilize with PT, likely home with home PT today. D/w pt and ortho team

## 2021-04-17 NOTE — PROGRESS NOTE ADULT - SUBJECTIVE AND OBJECTIVE BOX
pt seen at bedside doing well, no complaints of pain to left knee, denies N/T LLE no other complaints      Vital Signs Last 24 Hrs  T(C): 37.2 (17 Apr 2021 05:20), Max: 37.2 (16 Apr 2021 11:30)  T(F): 99 (17 Apr 2021 05:20), Max: 99 (17 Apr 2021 05:20)  HR: 95 (17 Apr 2021 05:20) (49 - 95)  BP: 120/51 (17 Apr 2021 05:20) (106/41 - 146/69)  BP(mean): --  RR: 17 (17 Apr 2021 05:20) (12 - 21)  SpO2: 100% (17 Apr 2021 05:20) (93% - 100%)    PE left knee  dressing c/d/i   compartmetns soft non tender  FROM ankle and toes  + EHL FHL GS TA   SILT   DP intact

## 2021-04-17 NOTE — PROGRESS NOTE ADULT - ASSESSMENT
This is a 75 year old female s/p left total knee replacement with high risk for VTE due to age, BMI, impaired mobility, surgery. She is low risk for bleeding.    Discussed Xarelto and the risks and benefits with patient. Emphasized the importance of taking medication daily to prevent VTE. Verbalized understanding and is in agreement with treatment plan.    Plan:  ::Xarelto 10 mg PO daily x 12 days starting tonight @ 6pm with last dose ---> 4/27  ::PPI- Protonix 40 mg PO daily   ::Daily CBC/BMP  ::Enc ambulation  ::Papi    Thank you for this consult, will continue to follow.  Dispo: Home  Script sent to pharmacy This is a 75 year old female s/p left total knee replacement with high risk for VTE due to age, BMI, impaired mobility, surgery. She is low risk for bleeding.    Discussed Xarelto and the risks and benefits with patient. Emphasized the importance of taking medication daily to prevent VTE. Verbalized understanding and is in agreement with treatment plan.    Plan:  ::Xarelto 10 mg PO daily x 12 days from 4/16 ---> 4/27  ::PPI- Protonix 40 mg PO daily   ::Daily CBC/BMP  ::Enc ambulation  ::Venodynes  Dispo: Home  Script sent to pharmacy, copay d/w pt and she is agreeable to cost

## 2021-04-25 ENCOUNTER — EMERGENCY (EMERGENCY)
Facility: HOSPITAL | Age: 76
LOS: 0 days | Discharge: HOME | End: 2021-04-25
Attending: EMERGENCY MEDICINE
Payer: MEDICARE

## 2021-04-25 VITALS
HEART RATE: 94 BPM | HEIGHT: 72 IN | OXYGEN SATURATION: 99 % | SYSTOLIC BLOOD PRESSURE: 150 MMHG | TEMPERATURE: 98 F | RESPIRATION RATE: 18 BRPM | WEIGHT: 169.98 LBS | DIASTOLIC BLOOD PRESSURE: 115 MMHG

## 2021-04-25 DIAGNOSIS — Z88.1 ALLERGY STATUS TO OTHER ANTIBIOTIC AGENTS STATUS: ICD-10-CM

## 2021-04-25 DIAGNOSIS — M25.562 PAIN IN LEFT KNEE: ICD-10-CM
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
## 2021-04-25 DIAGNOSIS — M50.30 OTHER CERVICAL DISC DEGENERATION, UNSPECIFIED CERVICAL REGION: ICD-10-CM

## 2021-04-25 DIAGNOSIS — M71.22 SYNOVIAL CYST OF POPLITEAL SPACE [BAKER], LEFT KNEE: ICD-10-CM

## 2021-04-25 DIAGNOSIS — Z91.040 LATEX ALLERGY STATUS: ICD-10-CM

## 2021-04-25 DIAGNOSIS — M17.12 UNILATERAL PRIMARY OSTEOARTHRITIS, LEFT KNEE: ICD-10-CM

## 2021-04-25 DIAGNOSIS — J45.909 UNSPECIFIED ASTHMA, UNCOMPLICATED: ICD-10-CM

## 2021-04-25 DIAGNOSIS — M51.26 OTHER INTERVERTEBRAL DISC DISPLACEMENT, LUMBAR REGION: ICD-10-CM

## 2021-04-25 DIAGNOSIS — Z79.01 LONG TERM (CURRENT) USE OF ANTICOAGULANTS: ICD-10-CM

## 2021-04-25 DIAGNOSIS — Z96.652 PRESENCE OF LEFT ARTIFICIAL KNEE JOINT: ICD-10-CM

## 2021-04-25 DIAGNOSIS — Z98.49 CATARACT EXTRACTION STATUS, UNSPECIFIED EYE: Chronic | ICD-10-CM

## 2021-04-25 DIAGNOSIS — I10 ESSENTIAL (PRIMARY) HYPERTENSION: ICD-10-CM

## 2021-04-25 DIAGNOSIS — Z98.890 OTHER SPECIFIED POSTPROCEDURAL STATES: Chronic | ICD-10-CM

## 2021-04-25 DIAGNOSIS — M96.89 OTHER INTRAOPERATIVE AND POSTPROCEDURAL COMPLICATIONS AND DISORDERS OF THE MUSCULOSKELETAL SYSTEM: ICD-10-CM

## 2021-04-25 DIAGNOSIS — E78.5 HYPERLIPIDEMIA, UNSPECIFIED: ICD-10-CM

## 2021-04-25 PROBLEM — J30.2 OTHER SEASONAL ALLERGIC RHINITIS: Chronic | Status: ACTIVE | Noted: 2021-04-07

## 2021-04-25 PROBLEM — K76.89 OTHER SPECIFIED DISEASES OF LIVER: Chronic | Status: ACTIVE | Noted: 2021-04-07

## 2021-04-25 PROBLEM — R31.29 OTHER MICROSCOPIC HEMATURIA: Chronic | Status: ACTIVE | Noted: 2021-04-07

## 2021-04-25 PROBLEM — M19.90 UNSPECIFIED OSTEOARTHRITIS, UNSPECIFIED SITE: Chronic | Status: ACTIVE | Noted: 2021-04-07

## 2021-04-25 PROCEDURE — 93971 EXTREMITY STUDY: CPT | Mod: LT

## 2021-04-25 PROCEDURE — 73564 X-RAY EXAM KNEE 4 OR MORE: CPT | Mod: LT

## 2021-04-25 PROCEDURE — 99284 EMERGENCY DEPT VISIT MOD MDM: CPT | Mod: 25

## 2021-04-25 PROCEDURE — 99284 EMERGENCY DEPT VISIT MOD MDM: CPT

## 2021-04-25 PROCEDURE — 93971 EXTREMITY STUDY: CPT | Mod: 26,LT

## 2021-04-25 PROCEDURE — 73564 X-RAY EXAM KNEE 4 OR MORE: CPT | Mod: 26,LT

## 2021-04-25 NOTE — ED CLERICAL - NS ED CLERK NOTE PRE-ARRIVAL INFORMATION; ADDITIONAL PRE-ARRIVAL INFORMATION
This patient is enrolled in the comprehensive joint replacement (CJR) program and has active care navigation.  This patient can be followed up by the care navigation team within 24 hours. To arrange close follow-up or to obtain additional clinical information about this patient, please call the contact number above. Please call the hospitalist for medical consultation ([phone]) PRIOR to admission or observation.  The hospitalist is part of the care team and can assist in acute medical management. Please call the orthopedic team () for ALL patients who require admission.

## 2021-04-25 NOTE — ED ADULT NURSE NOTE - CHIEF COMPLAINT QUOTE
pt presents to ed via ems from home for evaluation of worsening left knee pain, pt has recent left knee replacement with md savage. pt ambulates with walker at baseline. pt has bilat leg swelling, which is normal for her but left leg is worse. denies fall/trauma. pt did not take bp meds this morning. pt a&ox4

## 2021-04-25 NOTE — ED PROVIDER NOTE - CLINICAL SUMMARY MEDICAL DECISION MAKING FREE TEXT BOX
76 y/o female s/p knee replacement with Dr. Stephenson on 04/16 here with increasing pain. Plan: x-ray, US.

## 2021-04-25 NOTE — ED PROVIDER NOTE - OBJECTIVE STATEMENT
76 y/o female with a PMHx of cervical degenerative disc disease, lumbar herniated disc, arthritis, asthma, HTN, HLD, primary osteoarthritis of left knee s/p left knee arthroplasty with Dr. Stephenson on 04/16/2021, presents to the ED BIBEMS from home c/o increasing left knee pain that began yesterday. Pt s/p elective left knee replacement surgery on 04/16/2021 with Dr. Stephenson and discharged home on Xarelto for DVT/PE prophylaxis. Pt reports yesterday had pain surrounding left knee and to the back of left knee. Pt notes increased swelling and bruising surrounding left knee. Denies fall or trauma. Has not taken BP meds (Hyzaar) this morning. Pt ambulates with a walker at baseline. No other complaints at this time. PCP: Dr. Rafiq Gao. 74 y/o female with a PMHx of cervical degenerative disc disease, lumbar herniated disc, left Baker's cyst, arthritis, asthma, HTN, HLD, primary osteoarthritis of left knee s/p left knee arthroplasty with Dr. Stephenson on 04/16/2021, presents to the ED BIBEMS from home c/o increasing left knee pain that began yesterday. Pt s/p elective left knee replacement surgery on 04/16/2021 with Dr. Stephenson and discharged home on Xarelto for DVT/PE prophylaxis. Pt reports yesterday had pain surrounding left knee and to the back of left knee. Pt notes increased swelling and bruising surrounding left knee. Denies fall or trauma. Has not taken BP meds (Hyzaar) this morning. Pt ambulates with a walker at baseline. No other complaints at this time. PCP: Dr. Rafiq Gao.

## 2021-04-25 NOTE — ED ADULT TRIAGE NOTE - CHIEF COMPLAINT QUOTE
pt presents to ed via ems from home for evaluaion of worsening left knee pain, pt has recent left knee replacement with md savage. pt ambulates with walker at baseline. pt has bilat leg swelling, which is normal for her but left leg is worse. denies fall/trauma. pt did not take bp meds this morning. pt a&ox4

## 2021-04-25 NOTE — ED PROVIDER NOTE - PATIENT PORTAL LINK FT
You can access the FollowMyHealth Patient Portal offered by [facility] by registering at the following website: [email or web address]. By joining [employer]’s FollowMyHealth portal, you will also be able to view your health information using other applications (apps) compatible with our system.

## 2021-04-25 NOTE — ED PROVIDER NOTE - PMH
Arthritis    Asthma  controlled; never intubated  Bakers cyst, left    DDD (degenerative disc disease), cervical    HLD (hyperlipidemia)    HTN (hypertension)    Liver cyst    Lumbar herniated disc    Microscopic hematuria  history  Seasonal allergies

## 2021-04-25 NOTE — ED ADULT NURSE NOTE - OBJECTIVE STATEMENT
Pt brought in by ambulance c/o behind left knee pain/swelling s/o left knee surgery. Pt alert and oriented x4, able to ambulate with a walker in ED. Pt denies headache, shortness of breath, dizziness, nausea, vomiting, diarrhea. Pt with surgical staples noted to left anterior knee. Pt denies trauma/fall

## 2021-04-25 NOTE — ED PROVIDER NOTE - PROGRESS NOTE DETAILS
Mikie Mcneil for attending Dr. Butt: Called and discussed with Dr. Stephenson, given XR and US findings pt can be discharged.

## 2021-04-25 NOTE — ED PROVIDER NOTE - SKIN, MLM
Skin normal color for race, warm, dry and intact. No evidence of rash. Skin normal color for race ecchymosis left leg

## 2021-04-25 NOTE — ED PROVIDER NOTE - MUSCULOSKELETAL, MLM
+Left leg incision site intact, staples in place, 2+ pedal edema, lower leg ecchymosis from mid thigh down. Distal neurovascularly intact. Spine appears normal.

## 2021-04-25 NOTE — ED PROVIDER NOTE - NSFOLLOWUPINSTRUCTIONS_ED_ALL_ED_FT
Please call and follow up with Dr. Stephenson as instructed.      Knee Replacement    WHAT YOU NEED TO KNOW:    Knee replacement is surgery to replace all or part of your knee joint. It is also called knee arthroplasty. It is normal to have increased stiffness and pain after surgery. Your pain and stiffness should get better with exercise. You may be able to go home shortly after surgery. Your healthcare provider will talk to you about rehabilitation you can do at home. You may be given a joint replacement ID card. The card will tell what joint was replaced and when it was replaced. Tell all healthcare providers about your joint replacement surgery.    Knee Anatomy          DISCHARGE INSTRUCTIONS:    Call your local emergency number (911 in the [place]) for any of the following:   •You feel lightheaded, short of breath, and have chest pain.      •You cough up blood.      Call your doctor or surgeon if:   •Your leg feels warm, tender, and painful. It may look swollen and red.      •You cannot walk or move your knee.      •Blood soaks through your bandage.      •Your incision wound comes apart.      •Your incision area is red, swollen, or draining pus.      •You have a fever or chills.      •You have trouble moving or bending your knee.      •You have new knee pain or pain that does not get better with medicine.      •You have questions or concerns about your condition or care.      Medicines: You may need any of the following:   •Prescription pain medicine may be given. Ask your healthcare provider how to take this medicine safely. Some prescription pain medicines contain acetaminophen. Do not take other medicines that contain acetaminophen without talking to your healthcare provider. Too much acetaminophen may cause liver damage. Prescription pain medicine may cause constipation. Ask your healthcare provider how to prevent or treat constipation.       •NSAIDs, such as ibuprofen, help decrease swelling, pain, and fever. This medicine is available with or without a doctor's order. NSAIDs can cause stomach bleeding or kidney problems in certain people. If you take blood thinner medicine, always ask your healthcare provider if NSAIDs are safe for you. Always read the medicine label and follow directions.      •Blood thinners help prevent blood clots. Clots can cause strokes, heart attacks, and death. The following are general safety guidelines to follow while you are taking a blood thinner:?Watch for bleeding and bruising while you take blood thinners. Watch for bleeding from your gums or nose. Watch for blood in your urine and bowel movements. Use a soft washcloth on your skin, and a soft toothbrush to brush your teeth. This can keep your skin and gums from bleeding. If you shave, use an electric shaver. Do not play contact sports.       ?Tell your dentist and other healthcare providers that you take a blood thinner. Wear a bracelet or necklace that says you take this medicine.       ?Do not start or stop any other medicines unless your healthcare provider tells you to. Many medicines cannot be used with blood thinners.      ?Take your blood thinner exactly as prescribed by your healthcare provider. Do not skip does or take less than prescribed. Tell your provider right away if you forget to take your blood thinner, or if you take too much.      ?Warfarin is a blood thinner that you may need to take. The following are things you should be aware of if you take warfarin: ?Foods and medicines can affect the amount of warfarin in your blood. Do not make major changes to your diet while you take warfarin. Warfarin works best when you eat about the same amount of vitamin K every day. Vitamin K is found in green leafy vegetables and certain other foods. Ask for more information about what to eat when you are taking warfarin.      ?You will need to see your healthcare provider for follow-up visits when you are on warfarin. You will need regular blood tests. These tests are used to decide how much medicine you need.         •Take your medicine as directed. Contact your healthcare provider if you think your medicine is not helping or if you have side effects. Tell him or her if you are allergic to any medicine. Keep a list of the medicines, vitamins, and herbs you take. Include the amounts, and when and why you take them. Bring the list or the pill bottles to follow-up visits. Carry your medicine list with you in case of an emergency.      Self-care:   •Apply ice on your knee for 15 to 20 minutes every hour or as directed. Use an ice pack, or put crushed ice in a plastic bag. Cover it with a towel. Ice helps prevent tissue damage and decreases swelling and pain.       •Do not get the area wet until your healthcare provider says it is okay. When your provider says it is okay, carefully wash the area with soap and water. Dry the area and put on new, clean bandages as directed.      •Care for your incision area as directed. Do not put powders or lotions over the area. If you have strips of medical tape over the incision area, let them fall off on their own. If they do not fall off within 14 days, gently remove them. Check the area for signs of infection, such as swelling, redness, or pus.      •Go to physical or occupational therapy, if directed. A physical therapist will teach you exercises to build muscle strength, decrease pain and swelling, and prevent blood clots. An occupational therapist will show you how to do daily activities safely. He or she may recommend assistive devices to help you dress, bathe, and  objects. The assistive devices will help you prevent knee damage from twisting or bending.      Prevent blood clots:   •Wear pressure stockings as directed. Pressure stockings help keep blood from pooling in your leg veins. Your healthcare provider can prescribe stockings that are right for you. Do not buy over-the-counter pressure stockings unless your healthcare provider says it is okay. They may not fit correctly or may have elastic that cuts off your circulation. Ask your healthcare provider when to start wearing pressure stockings and how long to wear them each day.   Pressure Stockings            •Do not cross your legs for 6 weeks or as directed. Your risk for blood clots is greater when you cross your legs. You might also move your implant out of place.      Activity guidelines:   •Know your limits. Start activities slowly and give yourself rest periods. Pain and swelling can increase when you do too much. Do not do an activity until your healthcare provider says you are ready.      •Use assistive devices as directed. Your thigh muscles will be weak after surgery. Assistive devices will help you not fall.       •Go up the stairs by placing your non-operated leg on the step first. Then bring your operated leg to the same step. Repeat.      •Go down stairs by placing your operated leg down on the step first. Then bring your non-operated leg to the same step. Repeat.      •Do light housekeeping only. Ask your healthcare provider which housekeeping activities are okay for you. Do not vacuum, change sheets on a bed, or anything that makes you reach up, bend, or twist.      •Do not drive for at least 6 weeks or until your healthcare provider says it is okay.      •Do not play contact sports, tennis, golf, or ski until your healthcare provider says it is okay. These activities can loosen your knee implant.      Prevent falls:   •Remove all loose carpets and cords. These can cause you to trip and fall.      •Use a shower bench or chair when you take a shower to limit the time you are standing.      •Use a toilet seat riser with arms if your toilet seat is low. A toilet seat riser will help prevent bending or twisting your knee.      Metal detectors and MRIs after joint replacement surgery:   •The metal in your joint may set off metal detectors, such as at an airport. Tell the officials about your joint replacement surgery. You may also want to show your joint replacement ID card, if you were given one.      •MRIs are considered safe for people with joint replacements. The type of metal used is not magnetic. Tell all healthcare providers about your joint replacement surgery.      Follow up with your healthcare provider as directed: Your healthcare provider may contact you weeks after your surgery. He or she may ask if surgery helped relieve your pain or stiffness. Tell your provider how well you are able to do your daily activities after surgery. Also tell him or her if you are having any problems with mobility or range of motion. Write down your questions so you remember to ask them during your visits.          Jacobs Cyst    WHAT YOU NEED TO KNOW:    A Baker cyst is a bulging lump of fluid behind your knee. A Baker cyst can develop if you have a knee injury, or a condition such as osteoarthritis or a connective tissue disorder. A Baker cyst may also be called a popliteal cyst.    DISCHARGE INSTRUCTIONS:    Return to the emergency department if:   •You have severe pain.      •You have bruising on the ankle below the cyst.      •Your calf turns blue below the cyst.      •Your calf or knee is swollen or bleeding.      Call your doctor if:   •You have a fever.      •Your pain does not improve with medicine.      •You have questions or concerns about your condition or care.      Medicines:   •NSAIDs, such as ibuprofen, help decrease swelling, pain, and fever. NSAIDs can cause stomach bleeding or kidney problems in certain people. If you take blood thinner medicine, always ask your healthcare provider if NSAIDs are safe for you. Always read the medicine label and follow directions.      •Take your medicine as directed. Contact your healthcare provider if you think your medicine is not helping or if you have side effects. Tell him of her if you are allergic to any medicine. Keep a list of the medicines, vitamins, and herbs you take. Include the amounts, and when and why you take them. Bring the list or the pill bottles to follow-up visits. Carry your medicine list with you in case of an emergency.      Care for your knee:   •Rest as needed. Limit movement as your knee heals. This will help decrease the risk of more damage to your knee. You may need crutches to take weight off your injured knee. Use crutches as directed.      •Ice your knee. Ice helps decrease swelling and pain. Use an ice pack, or put ice in a plastic bag. Cover it with a towel before you place it on your skin. Ice your knee for 15 to 20 minutes, 3 to 4 times each day. Do this for 2 to 3 days.      •Support your knee. Wrap your knee with an elastic bandage. Ask your healthcare provider if you need a brace for more support. This will help decrease swelling and movement so your knee can heal.      •Elevate your knee. Use pillows to raise your knee above the level of your heart as often as you can. This will help decrease swelling. Do not put the pillow directly under your knee. Put it under your calf instead.  Elevate Leg           •Go to physical therapy as directed. A physical therapist teaches you exercises to help improve movement and strength, and to decrease pain.      Follow up with your doctor as directed: Write down your questions so you remember to ask them during your visits.

## 2021-04-28 ENCOUNTER — INPATIENT (INPATIENT)
Facility: HOSPITAL | Age: 76
LOS: 0 days | Discharge: HOME | DRG: 880 | End: 2021-04-28
Attending: INTERNAL MEDICINE | Admitting: INTERNAL MEDICINE
Payer: MEDICARE

## 2021-04-28 ENCOUNTER — TRANSCRIPTION ENCOUNTER (OUTPATIENT)
Age: 76
End: 2021-04-28

## 2021-04-28 VITALS
RESPIRATION RATE: 36 BRPM | DIASTOLIC BLOOD PRESSURE: 89 MMHG | OXYGEN SATURATION: 99 % | SYSTOLIC BLOOD PRESSURE: 146 MMHG | HEART RATE: 90 BPM

## 2021-04-28 VITALS
OXYGEN SATURATION: 94 % | TEMPERATURE: 98 F | RESPIRATION RATE: 18 BRPM | DIASTOLIC BLOOD PRESSURE: 75 MMHG | SYSTOLIC BLOOD PRESSURE: 119 MMHG | HEART RATE: 95 BPM

## 2021-04-28 DIAGNOSIS — Z98.890 OTHER SPECIFIED POSTPROCEDURAL STATES: Chronic | ICD-10-CM

## 2021-04-28 DIAGNOSIS — Z98.49 CATARACT EXTRACTION STATUS, UNSPECIFIED EYE: Chronic | ICD-10-CM

## 2021-04-28 DIAGNOSIS — R09.02 HYPOXEMIA: ICD-10-CM

## 2021-04-28 DIAGNOSIS — R09.89 OTHER SPECIFIED SYMPTOMS AND SIGNS INVOLVING THE CIRCULATORY AND RESPIRATORY SYSTEMS: ICD-10-CM

## 2021-04-28 DIAGNOSIS — Z96.659 PRESENCE OF UNSPECIFIED ARTIFICIAL KNEE JOINT: Chronic | ICD-10-CM

## 2021-04-28 LAB
ALBUMIN SERPL ELPH-MCNC: 3.7 G/DL — SIGNIFICANT CHANGE UP (ref 3.3–5.2)
ALP SERPL-CCNC: 103 U/L — SIGNIFICANT CHANGE UP (ref 40–120)
ALT FLD-CCNC: 17 U/L — SIGNIFICANT CHANGE UP
ANION GAP SERPL CALC-SCNC: 12 MMOL/L — SIGNIFICANT CHANGE UP (ref 5–17)
APTT BLD: 31.5 SEC — SIGNIFICANT CHANGE UP (ref 27.5–35.5)
AST SERPL-CCNC: 17 U/L — SIGNIFICANT CHANGE UP
BASOPHILS # BLD AUTO: 0.04 K/UL — SIGNIFICANT CHANGE UP (ref 0–0.2)
BASOPHILS NFR BLD AUTO: 0.4 % — SIGNIFICANT CHANGE UP (ref 0–2)
BILIRUB SERPL-MCNC: 1 MG/DL — SIGNIFICANT CHANGE UP (ref 0.4–2)
BUN SERPL-MCNC: 15 MG/DL — SIGNIFICANT CHANGE UP (ref 8–20)
CALCIUM SERPL-MCNC: 9.1 MG/DL — SIGNIFICANT CHANGE UP (ref 8.6–10.2)
CHLORIDE SERPL-SCNC: 100 MMOL/L — SIGNIFICANT CHANGE UP (ref 98–107)
CK SERPL-CCNC: 35 U/L — SIGNIFICANT CHANGE UP (ref 25–170)
CO2 SERPL-SCNC: 26 MMOL/L — SIGNIFICANT CHANGE UP (ref 22–29)
CREAT SERPL-MCNC: 0.77 MG/DL — SIGNIFICANT CHANGE UP (ref 0.5–1.3)
EOSINOPHIL # BLD AUTO: 0.22 K/UL — SIGNIFICANT CHANGE UP (ref 0–0.5)
EOSINOPHIL NFR BLD AUTO: 2.1 % — SIGNIFICANT CHANGE UP (ref 0–6)
GLUCOSE SERPL-MCNC: 108 MG/DL — HIGH (ref 70–99)
HCT VFR BLD CALC: 34.1 % — LOW (ref 34.5–45)
HGB BLD-MCNC: 11 G/DL — LOW (ref 11.5–15.5)
IMM GRANULOCYTES NFR BLD AUTO: 0.7 % — SIGNIFICANT CHANGE UP (ref 0–1.5)
INR BLD: 1.16 RATIO — SIGNIFICANT CHANGE UP (ref 0.88–1.16)
LACTATE BLDV-MCNC: 1.8 MMOL/L — SIGNIFICANT CHANGE UP (ref 0.5–2)
LYMPHOCYTES # BLD AUTO: 1.63 K/UL — SIGNIFICANT CHANGE UP (ref 1–3.3)
LYMPHOCYTES # BLD AUTO: 15.7 % — SIGNIFICANT CHANGE UP (ref 13–44)
MAGNESIUM SERPL-MCNC: 2 MG/DL — SIGNIFICANT CHANGE UP (ref 1.6–2.6)
MCHC RBC-ENTMCNC: 29.7 PG — SIGNIFICANT CHANGE UP (ref 27–34)
MCHC RBC-ENTMCNC: 32.3 GM/DL — SIGNIFICANT CHANGE UP (ref 32–36)
MCV RBC AUTO: 92.2 FL — SIGNIFICANT CHANGE UP (ref 80–100)
MONOCYTES # BLD AUTO: 0.47 K/UL — SIGNIFICANT CHANGE UP (ref 0–0.9)
MONOCYTES NFR BLD AUTO: 4.5 % — SIGNIFICANT CHANGE UP (ref 2–14)
NEUTROPHILS # BLD AUTO: 7.94 K/UL — HIGH (ref 1.8–7.4)
NEUTROPHILS NFR BLD AUTO: 76.6 % — SIGNIFICANT CHANGE UP (ref 43–77)
NT-PROBNP SERPL-SCNC: 149 PG/ML — SIGNIFICANT CHANGE UP (ref 0–300)
PLATELET # BLD AUTO: 546 K/UL — HIGH (ref 150–400)
POTASSIUM SERPL-MCNC: 3.9 MMOL/L — SIGNIFICANT CHANGE UP (ref 3.5–5.3)
POTASSIUM SERPL-SCNC: 3.9 MMOL/L — SIGNIFICANT CHANGE UP (ref 3.5–5.3)
PROT SERPL-MCNC: 6.5 G/DL — LOW (ref 6.6–8.7)
PROTHROM AB SERPL-ACNC: 13.4 SEC — SIGNIFICANT CHANGE UP (ref 10.6–13.6)
RAPID RVP RESULT: SIGNIFICANT CHANGE UP
RBC # BLD: 3.7 M/UL — LOW (ref 3.8–5.2)
RBC # FLD: 14.6 % — HIGH (ref 10.3–14.5)
SARS-COV-2 RNA SPEC QL NAA+PROBE: SIGNIFICANT CHANGE UP
SODIUM SERPL-SCNC: 138 MMOL/L — SIGNIFICANT CHANGE UP (ref 135–145)
TROPONIN T SERPL-MCNC: <0.01 NG/ML — SIGNIFICANT CHANGE UP (ref 0–0.06)
TROPONIN T SERPL-MCNC: <0.01 NG/ML — SIGNIFICANT CHANGE UP (ref 0–0.06)
WBC # BLD: 10.37 K/UL — SIGNIFICANT CHANGE UP (ref 3.8–10.5)
WBC # FLD AUTO: 10.37 K/UL — SIGNIFICANT CHANGE UP (ref 3.8–10.5)

## 2021-04-28 PROCEDURE — 93010 ELECTROCARDIOGRAM REPORT: CPT

## 2021-04-28 PROCEDURE — 99285 EMERGENCY DEPT VISIT HI MDM: CPT | Mod: CS

## 2021-04-28 PROCEDURE — 82550 ASSAY OF CK (CPK): CPT

## 2021-04-28 PROCEDURE — 85025 COMPLETE CBC W/AUTO DIFF WBC: CPT

## 2021-04-28 PROCEDURE — 84484 ASSAY OF TROPONIN QUANT: CPT

## 2021-04-28 PROCEDURE — 99222 1ST HOSP IP/OBS MODERATE 55: CPT

## 2021-04-28 PROCEDURE — 83735 ASSAY OF MAGNESIUM: CPT

## 2021-04-28 PROCEDURE — 0225U NFCT DS DNA&RNA 21 SARSCOV2: CPT

## 2021-04-28 PROCEDURE — 85610 PROTHROMBIN TIME: CPT

## 2021-04-28 PROCEDURE — 83880 ASSAY OF NATRIURETIC PEPTIDE: CPT

## 2021-04-28 PROCEDURE — 94640 AIRWAY INHALATION TREATMENT: CPT

## 2021-04-28 PROCEDURE — 36415 COLL VENOUS BLD VENIPUNCTURE: CPT

## 2021-04-28 PROCEDURE — 71275 CT ANGIOGRAPHY CHEST: CPT

## 2021-04-28 PROCEDURE — 99222 1ST HOSP IP/OBS MODERATE 55: CPT | Mod: AI

## 2021-04-28 PROCEDURE — 83605 ASSAY OF LACTIC ACID: CPT

## 2021-04-28 PROCEDURE — 71045 X-RAY EXAM CHEST 1 VIEW: CPT

## 2021-04-28 PROCEDURE — 80053 COMPREHEN METABOLIC PANEL: CPT

## 2021-04-28 PROCEDURE — 99285 EMERGENCY DEPT VISIT HI MDM: CPT | Mod: 25

## 2021-04-28 PROCEDURE — 85730 THROMBOPLASTIN TIME PARTIAL: CPT

## 2021-04-28 PROCEDURE — 93005 ELECTROCARDIOGRAM TRACING: CPT

## 2021-04-28 RX ORDER — MOMETASONE FUROATE 220 UG/1
1 INHALANT RESPIRATORY (INHALATION) DAILY
Refills: 0 | Status: DISCONTINUED | OUTPATIENT
Start: 2021-04-28 | End: 2021-04-28

## 2021-04-28 RX ORDER — MONTELUKAST 4 MG/1
10 TABLET, CHEWABLE ORAL DAILY
Refills: 0 | Status: DISCONTINUED | OUTPATIENT
Start: 2021-04-28 | End: 2021-04-28

## 2021-04-28 RX ORDER — ALBUTEROL 90 UG/1
2.5 AEROSOL, METERED ORAL ONCE
Refills: 0 | Status: COMPLETED | OUTPATIENT
Start: 2021-04-28 | End: 2021-04-28

## 2021-04-28 RX ORDER — ENOXAPARIN SODIUM 100 MG/ML
40 INJECTION SUBCUTANEOUS DAILY
Refills: 0 | Status: DISCONTINUED | OUTPATIENT
Start: 2021-04-28 | End: 2021-04-28

## 2021-04-28 RX ORDER — PANTOPRAZOLE SODIUM 20 MG/1
40 TABLET, DELAYED RELEASE ORAL
Refills: 0 | Status: DISCONTINUED | OUTPATIENT
Start: 2021-04-28 | End: 2021-04-28

## 2021-04-28 RX ORDER — HYDROCHLOROTHIAZIDE 25 MG
12.5 TABLET ORAL DAILY
Refills: 0 | Status: DISCONTINUED | OUTPATIENT
Start: 2021-04-28 | End: 2021-04-28

## 2021-04-28 RX ORDER — LOSARTAN POTASSIUM 100 MG/1
100 TABLET, FILM COATED ORAL DAILY
Refills: 0 | Status: DISCONTINUED | OUTPATIENT
Start: 2021-04-28 | End: 2021-04-28

## 2021-04-28 RX ORDER — ALBUTEROL 90 UG/1
2 AEROSOL, METERED ORAL EVERY 6 HOURS
Refills: 0 | Status: DISCONTINUED | OUTPATIENT
Start: 2021-04-28 | End: 2021-04-28

## 2021-04-28 RX ORDER — ALBUTEROL 90 UG/1
1 AEROSOL, METERED ORAL EVERY 4 HOURS
Refills: 0 | Status: DISCONTINUED | OUTPATIENT
Start: 2021-04-28 | End: 2021-04-28

## 2021-04-28 RX ORDER — ALBUTEROL 90 UG/1
2.5 AEROSOL, METERED ORAL EVERY 6 HOURS
Refills: 0 | Status: DISCONTINUED | OUTPATIENT
Start: 2021-04-28 | End: 2021-04-28

## 2021-04-28 RX ADMIN — ALBUTEROL 2.5 MILLIGRAM(S): 90 AEROSOL, METERED ORAL at 02:55

## 2021-04-28 NOTE — ED CLERICAL - NS ED CLERK NOTE PRE-ARRIVAL INFORMATION; ADDITIONAL PRE-ARRIVAL INFORMATION
This patient is enrolled in the comprehensive joint replacement (CJR) program and has active care navigation.  This patient can be followed up by the care navigation team within 24 hours. To arrange close follow-up or to obtain additional clinical information about this patient, please call the contact number above. Please call the orthopedic PA on call ([phone]) for ALL patients who may be admitted.

## 2021-04-28 NOTE — ED ADULT TRIAGE NOTE - CHIEF COMPLAINT QUOTE
patient with complaints of difficulty breathing and shortness of breath, states that she had a knee replacement on 4/16, patient states that she has been fine since the surgery until she began to have increasing pain to her left leg and  tonight when she went to sleep she was unable to catch her breath. patient with shallow labored breathing, difficulty speaking in complete sentences.

## 2021-04-28 NOTE — ED ADULT NURSE NOTE - OBJECTIVE STATEMENT
A&Ox3;skin warm and dry. Pt with labored breathing and tachypnea upon arrival- CM NSR 90's. IV placed with labs drawn- nebulizer treatment started.  Monitored closely

## 2021-04-28 NOTE — ED ADULT NURSE REASSESSMENT NOTE - NS ED NURSE REASSESS COMMENT FT1
Nebulizer treatment completed at 0310- as per MD pt to be kept on RA for assessment of pulse ox. Pt breathing improved-less tachypneic.Pt states she feels a little better. Pt maintained at 99% RA; continuous monitoring in place

## 2021-04-28 NOTE — DISCHARGE NOTE PROVIDER - HOSPITAL COURSE
The patient is a 75 year old female with a past medical history of osteoarthritis status post left total knee replacement at [facility] on 4/16, asthma, hyperlipidemia and hypertension who was brought to the ER by EMS for hypoxia. According to the patient, last night she was unable to sleep due to shortness of breath, all of sudden she felt it worsening and woke her  to call EMS. EMS found her tachypneic with an SPO2 in the 70s improved to 98% on 2liters via nasal cannula.  In the ER, given Albuterol nebulizer treatment x 1 with improvement in hypoxia. CTA of the chest negative for PE, dissection or consolidation. BNP and troponin within normal limits. COVID negative. After albuterol treatment Spo2 98% on room air at rest and 100% with ambulation to the bathroom. At the time of my examination patient denies sob, chest pain, palpitations, cough or wheezing. Post op she completed her prescribed Xarelto prophylaxis yesterday. On Sunday she had complaints of leg pain; she was seen in the ER. Duplex was negative for DVT LLE; LLE xray showed increased anterior thigh swelling and was sent home.    The patient is a 75 year old female with a past medical history of osteoarthritis status post left total knee replacement at [facility] on 4/16, asthma, hyperlipidemia and hypertension who was brought to the ER by EMS for hypoxia. According to the patient, last night she was unable to sleep due to shortness of breath, all of sudden she felt it worsening and woke her  to call EMS. EMS found her tachypneic with an SPO2 in the 70s improved to 98% on 2liters via nasal cannula.  In the ER, given Albuterol nebulizer treatment x 1 with improvement in hypoxia. CTA of the chest negative for PE, dissection or consolidation. BNP and troponin within normal limits. COVID negative. After albuterol treatment Spo2 98% on room air at rest and 100% with ambulation to the bathroom. At the time of my examination patient denies sob, chest pain, palpitations, cough or wheezing. Post op she completed her prescribed Xarelto prophylaxis yesterday. On Sunday she had complaints of leg pain; she was seen in the ER. Duplex was negative for DVT LLE; LLE xray showed increased anterior thigh swelling and was sent home.  Suspect dyspnea secondary to anxiety. Stable for discharge home to follow up with outpatient pulmonologist    56 mins spent

## 2021-04-28 NOTE — DISCHARGE NOTE NURSING/CASE MANAGEMENT/SOCIAL WORK - PATIENT PORTAL LINK FT
You can access the FollowMyHealth Patient Portal offered by [facility] by registering at the following website: [email or web address]. By joining [employer]’s FollowMyHealth portal, you will also be able to view your health information using other applications (apps) compatible with our system.

## 2021-04-28 NOTE — CONSULT NOTE ADULT - ASSESSMENT
Assess    GERD with Bronchospasm  Anxiety with panic and discoordinate breathing  Doubt but haven't excluded paradoxical vocal cord motion    Rec    GERD measures (truncal elevation for sleep - no food within 90 minutes of hs) reviewed with patient  Pursed lip breathing (smell the roses (one count) - blow out the candles (3-4 count) reviewed with patient  Inhaler technique reviewed with patient   Patient reassured and ready for discharge  FU with her pulmonologist   Consider ENT and Allergy evaluation (R-O Paradoxical VC motion) as OP   Can be Discharged

## 2021-04-28 NOTE — CONSULT NOTE ADULT - TIME BILLING
Review of notes, orders, labs and images  Bedside evaluation  Reviewed reflux measures, Pursed-lip breathing and MDI technique with patient  Reassured the patient   OP FU with her pulmonologist and possibly ENT and Allergy advised

## 2021-04-28 NOTE — ED PROVIDER NOTE - PROGRESS NOTE DETAILS
Patient stable with persistent tachypnea, no change with albuterol inhaler. Patient with improved symptoms and vitals. Unclear cause to hypoxia without signs of bronchospasm. Labs and CT are as noted.

## 2021-04-28 NOTE — DISCHARGE NOTE PROVIDER - NSDCMRMEDTOKEN_GEN_ALL_CORE_FT
Albuterol (Eqv-ProAir HFA) 90 mcg/inh inhalation aerosol: 2 puff(s) inhaled every 6 hours, As Needed  fluvastatin 20 mg oral capsule: 1 cap(s) orally once a day  Hyzaar 100 mg-12.5 mg oral tablet: 1 tab(s) orally once a day  Protonix 40 mg oral delayed release tablet: 1 tab(s) orally once a day  Singulair 10 mg oral tablet: 1 tab(s) orally once a day

## 2021-04-28 NOTE — H&P ADULT - NSICDXPASTMEDICALHX_GEN_ALL_CORE_FT
PAST MEDICAL HISTORY:  Essential hypertension     Hypercholesterolemia     Mild intermittent asthma without complication

## 2021-04-28 NOTE — CONSULT NOTE ADULT - SUBJECTIVE AND OBJECTIVE BOX
PULMONARY CONSULT NOTE      TRISTIAN BRUMFIELD-[id number]    Patient is a 75y old  Female who presents with a chief complaint of Hypoxia (28 Apr 2021 08:35)      HISTORY OF PRESENT ILLNESS:    The patient is a 75 year old female with a past medical history of osteoarthritis status post left total knee replacement at [facility] on 4/16, asthma, hyperlipidemia and hypertension who was brought to the ER by EMS for hypoxia. According to the patient, last night she was unable to sleep due to shortness of breath, all of sudden she felt it worsening and woke her  to call EMS. EMS found her tachypneic with an SPO2 in the 70s improved to 98% on 2liters via nasal cannula.  In the ER, given Albuterol nebulizer treatment x 1 with improvement in hypoxia. CTA of the chest negative for PE, dissection or consolidation. BNP and troponin within normal limits. COVID negative. After albuterol treatment Spo2 98% on room air at rest and 100% with ambulation to the bathroom. At the time of my examination patient denies sob, chest pain, palpitations, cough or wheezing. Post op she completed her prescribed Xarelto prophylaxis yesterday. On Sunday she had complaints of leg pain; she was seen in the ER. Duplex was negative for DVT LLE; LLE xray showed increased anterior thigh swelling and was sent home.   No new exposures  Mild intermittent asthma - essentially never hospitalized nor on steroid     MEDICATIONS  (STANDING):  ALBUTerol    0.083% 2.5 milliGRAM(s) Nebulizer every 6 hours  ALBUTerol    90 MICROgram(s) HFA Inhaler 1 Puff(s) Inhalation every 4 hours  enoxaparin Injectable 40 milliGRAM(s) SubCutaneous daily  hydrochlorothiazide 12.5 milliGRAM(s) Oral daily  losartan 100 milliGRAM(s) Oral daily  mometasone 220 MICROgram(s) Inhaler 1 Puff(s) Inhalation daily  montelukast 10 milliGRAM(s) Oral daily  pantoprazole    Tablet 40 milliGRAM(s) Oral before breakfast      MEDICATIONS  (PRN):      Allergies    Biaxin (Faint)  doxycycline (Short breath; Swelling)  latex (Blisters)  Levaquin (Other)  Lipitor (Other)  yellow dye #5 (Hives; Rash)    Intolerances        PAST MEDICAL & SURGICAL HISTORY:  Essential hypertension    Mild intermittent asthma without complication    Hypercholesterolemia    H/O total knee replacement        FAMILY HISTORY:  No pertinent family history in first degree relatives        SOCIAL HISTORY  Smoking History:     REVIEW OF SYSTEMS:    CONSTITUTIONAL:  No fevers, chills, sweats    HEENT:  Eyes:  No diplopia or blurred vision. ENT:  No earache, sore throat or runny nose.    CARDIOVASCULAR:  No pressure, squeezing, tightness, or heaviness about the chest; no palpitations.    RESPIRATORY:  No cough, shortness of breath, PND or orthopnea. Mild SOBOE    GASTROINTESTINAL:  No abdominal pain, nausea, vomiting or diarrhea.    GENITOURINARY:  No dysuria, frequency or urgency.    NEUROLOGIC:  No paresthesias, fasciculations, seizures or weakness.    PSYCHIATRIC:  No disorder of thought or mood.    Vital Signs Last 24 Hrs  T(C): 36.9 (28 Apr 2021 07:49), Max: 36.9 (28 Apr 2021 07:49)  T(F): 98.4 (28 Apr 2021 07:49), Max: 98.4 (28 Apr 2021 07:49)  HR: 92 (28 Apr 2021 07:49) (78 - 98)  BP: 119/76 (28 Apr 2021 07:49) (119/76 - 163/77)  BP(mean): --  RR: 19 (28 Apr 2021 07:49) (19 - 40)  SpO2: 96% (28 Apr 2021 07:49) (96% - 100%)    PHYSICAL EXAMINATION:    GENERAL: The patient is a well-developed, well-nourished _____in no apparent distress.     HEENT: Head is normocephalic and atraumatic. Extraocular muscles are intact. Mucous membranes are moist.     NECK: Supple.     LUNGS: Clear to auscultation without wheezing, rales, or rhonchi. Respirations unlabored    HEART: Regular rate and rhythm without murmur.    ABDOMEN: Soft, nontender, and nondistended.  No hepatosplenomegaly is noted.    EXTREMITIES: Without any cyanosis, clubbing, rash, lesions or edema.    NEUROLOGIC: Grossly intact.      LABS:                        11.0   10.37 )-----------( 546      ( 28 Apr 2021 02:55 )             34.1     04-28    138  |  100  |  15.0  ----------------------------<  108<H>  3.9   |  26.0  |  0.77    Ca    9.1      28 Apr 2021 02:55  Mg     2.0     04-28    TPro  6.5<L>  /  Alb  3.7  /  TBili  1.0  /  DBili  x   /  AST  17  /  ALT  17  /  AlkPhos  103  04-28    PT/INR - ( 28 Apr 2021 02:55 )   PT: 13.4 sec;   INR: 1.16 ratio         PTT - ( 28 Apr 2021 02:55 )  PTT:31.5 sec      CARDIAC MARKERS ( 28 Apr 2021 12:44 )  x     / <0.01 ng/mL / 35 U/L / x     / x      CARDIAC MARKERS ( 28 Apr 2021 02:55 )  x     / <0.01 ng/mL / x     / x     / x            Serum Pro-Brain Natriuretic Peptide: 149 pg/mL (04-28-21 @ 02:55)        RADIOLOGY & ADDITIONAL STUDIES:  Leg Duplex and CTA normal    EKG normal

## 2021-04-28 NOTE — H&P ADULT - HISTORY OF PRESENT ILLNESS
The patient is a 75 year old female with a past medical history of osteoarthritis status post left total knee replacement at [facility] on 4/16, asthma, hyperlipidemia and hypertension who was brought to the ER by EMS for hypoxia. According to the patient, last night she was unable to sleep due to shortness of breath, all of sudden she felt it worsening and woke her  to call EMS. EMS found her tachypneic with an SPO2 in the 70s improved to 98% on 2liters via nasal cannula.  In the ER, given Albuterol nebulizer treatment x 1 with improvement in hypoxia. CTA of the chest negative for PE, dissection or consolidation. BNP and troponin within normal limits. COVID negative. After albuterol treatment Spo2 98% on room air at rest and 100% with ambulation to the bathroom. At the time of my examination patient denies sob, chest pain, palpitations, cough or wheezing. Post op she completed her prescribed Xarelto prophylaxis yesterday. On Sunday she had complaints of leg pain; she was seen in the ER. Duplex was negative for DVT LLE; LLE xray showed increased anterior thigh swelling and was sent home.

## 2021-04-28 NOTE — DISCHARGE NOTE PROVIDER - NSDCCPCAREPLAN_GEN_ALL_CORE_FT
PRINCIPAL DISCHARGE DIAGNOSIS  Diagnosis: Hypoxia  Assessment and Plan of Treatment: Suspected secondary to asthma exacerbation  Resolved now      SECONDARY DISCHARGE DIAGNOSES  Diagnosis: S/P total knee replacement, left  Assessment and Plan of Treatment:      PRINCIPAL DISCHARGE DIAGNOSIS  Diagnosis: Hypoxia  Assessment and Plan of Treatment: Suspected secondary to asthma exacerbation with a compnonent of anxiety   Resolved now      SECONDARY DISCHARGE DIAGNOSES  Diagnosis: S/P total knee replacement, left  Assessment and Plan of Treatment:

## 2021-04-28 NOTE — H&P ADULT - ASSESSMENT
The patient is a 75 year old female with a past medical history of osteoarthritis status post left total knee replacement at \[facility]\"" on 4/16, asthma, hyperlipidemia and hypertension who was brought to the ER by EMS for hypoxia.     Assessment/Plan:    1. Hypoxia suspect secondary to asthma exacerbation resolved with Albuterol: Check Spo2 on room air with ambulation  If clinically stable will discharge home later this afternoon   Continue singulair     2. Status post left total knee replacement 4/16: PT evaluation  Pain controlled  To follow up with ortho as an outpatient    3. Hypertension On hyzaar    4. Hyperlipidemia on statin therapy

## 2021-04-28 NOTE — ED ADULT NURSE REASSESSMENT NOTE - NS ED NURSE REASSESS COMMENT FT1
Pt care assumed from CC RN LF. Pt in no apparent distress at this time. pt A&OX4 resting in stretcher. C/o increased SOB. Reports she had TKR on 4/16 and began to develop LE pain, seen at [facility] and had a clear workup. Reports increased dyspnea earlier tonight. Placed on cardiac monitor and .

## 2021-04-28 NOTE — ED ADULT NURSE REASSESSMENT NOTE - NS ED NURSE REASSESS COMMENT FT1
Assumed care of patient at 0440, patient ambulated to rest room with stand by assist to void. Patient O2 on RA once she returned was 98%, full set of VS recorded. Patient gets very anxious with ambulation however was determined to walk to the  instead of using a bed pan. Pending CT results and disposition. Safety maintained, needs attended, will continue to monitor.

## 2021-04-28 NOTE — DISCHARGE NOTE PROVIDER - CARE PROVIDER_API CALL
Jeffrey Stephenson)  Orthopaedic Surgery  [address] [place] [address] [place] [address]  Phone: [phone]  Fax: [phone]  Follow Up Time:

## 2021-04-28 NOTE — ED PROVIDER NOTE - OBJECTIVE STATEMENT
76 yo female presents for evaluation of acute dyspnea. Patient states that she has been doing well. She underwent left knee replacement several weeks ago at [facility]. The procedure went well. however, She developed left calf discomfort about a week ago and was seen at [facility] and the evaluation was negative. She states she has been doing well until about an hour or so prior to presentation when she woke up feeling short of breath. EMS was called and found the patient breathing in the 20's with a pulse ox in the 70's and clear lungs. She was placed on supplemental oxygenation with improvement of her oxygen sats but persistent symptoms. Patient was given sublingual nitroglycerin enr-oute to the ED with significant improvement in symptoms. Currently complaining of dyspnea. Denies headache, nausea, vomiting, chest/neck/abdomen/back pain, fever, chills, worsening left leg disease. 76 yo female presents for evaluation of acute dyspnea. Patient states that she has been doing well. She underwent left knee replacement several weeks ago at [facility]. The procedure went well. however, She developed left calf discomfort about a week ago and was seen at [facility] and the evaluation was negative. She states she has been doing well until about an hour or so prior to presentation when she woke up feeling short of breath. EMS was called and found the patient breathing in the 20's with a pulse ox in the 70's and clear lungs. She was placed on supplemental oxygenation with improvement of her oxygen sats but persistent symptoms. Patient was given sublingual nitroglycerin enr-oute to the ED with significant improvement in symptoms. Report directly from [facility] EMS team. Currently complaining of dyspnea. Denies headache, nausea, vomiting, chest/neck/abdomen/back pain, fever, chills, worsening left leg disease.

## 2021-04-29 RX ORDER — MONTELUKAST 4 MG/1
1 TABLET, CHEWABLE ORAL
Qty: 0 | Refills: 0 | DISCHARGE

## 2021-04-29 RX ORDER — ALBUTEROL 90 UG/1
2 AEROSOL, METERED ORAL
Qty: 0 | Refills: 0 | DISCHARGE

## 2021-04-29 RX ORDER — LOSARTAN/HYDROCHLOROTHIAZIDE 100MG-25MG
1 TABLET ORAL
Qty: 0 | Refills: 0 | DISCHARGE

## 2021-04-29 RX ORDER — PANTOPRAZOLE SODIUM 20 MG/1
1 TABLET, DELAYED RELEASE ORAL
Qty: 0 | Refills: 0 | DISCHARGE

## 2021-04-29 RX ORDER — FLUVASTATIN SODIUM 80 MG/1
1 TABLET, FILM COATED, EXTENDED RELEASE ORAL
Qty: 0 | Refills: 0 | DISCHARGE

## 2021-04-30 ENCOUNTER — APPOINTMENT (OUTPATIENT)
Dept: CARE COORDINATION | Facility: HOME HEALTH | Age: 76
End: 2021-04-30
Payer: MEDICARE

## 2021-04-30 VITALS
DIASTOLIC BLOOD PRESSURE: 70 MMHG | OXYGEN SATURATION: 99 % | HEART RATE: 88 BPM | SYSTOLIC BLOOD PRESSURE: 144 MMHG | RESPIRATION RATE: 20 BRPM

## 2021-04-30 DIAGNOSIS — J45.20 MILD INTERMITTENT ASTHMA, UNCOMPLICATED: ICD-10-CM

## 2021-04-30 DIAGNOSIS — Z96.652 PRESENCE OF LEFT ARTIFICIAL KNEE JOINT: ICD-10-CM

## 2021-04-30 PROCEDURE — 99024 POSTOP FOLLOW-UP VISIT: CPT

## 2021-04-30 RX ORDER — FLUTICASONE PROPIONATE 110 UG/1
110 AEROSOL, METERED RESPIRATORY (INHALATION) TWICE DAILY
Qty: 1 | Refills: 0 | Status: ACTIVE | COMMUNITY
Start: 2021-04-30 | End: 1900-01-01

## 2021-04-30 RX ORDER — ALBUTEROL SULFATE 90 UG/1
108 (90 BASE) INHALANT RESPIRATORY (INHALATION)
Qty: 1 | Refills: 0 | Status: ACTIVE | COMMUNITY
Start: 2021-04-30 | End: 1900-01-01

## 2021-04-30 RX ORDER — LOSARTAN POTASSIUM AND HYDROCHLOROTHIAZIDE 100; 12.5 MG/1; MG/1
100-12.5 TABLET, FILM COATED ORAL DAILY
Refills: 0 | Status: ACTIVE | COMMUNITY
Start: 2021-04-30

## 2021-04-30 RX ORDER — FLUVASTATIN 20 MG/1
20 CAPSULE ORAL
Refills: 0 | Status: ACTIVE | COMMUNITY
Start: 2021-04-30

## 2021-04-30 RX ORDER — MONTELUKAST SODIUM 10 MG/1
10 TABLET, FILM COATED ORAL
Refills: 0 | Status: ACTIVE | COMMUNITY
Start: 2021-04-30

## 2021-04-30 NOTE — HISTORY OF PRESENT ILLNESS
[Post-hospitalization from ___ Hospital] : Post-hospitalization from [unfilled] Hospital [Admitted on: ___] : The patient was admitted on [unfilled] [FreeTextEntry2] : FROM [facility] PROVID[facility]\par  Discharge Note Provider [Charted Location: [facility] 1606 11] [Authored: 28-Apr-2021 08:35]- for Visit: [id number], Complete, Revised, Signed in Full, General\par \par \par  Hospital Course:\par Discharge Date	28-Apr-2021\par Admission Date	28-Apr-2021 06:43\par Reason for Admission	Hypoxia\par Hospital Course	\par The patient is a 75 year old female with a past medical history of osteoarthritis status post left total knee replacement at [facility] on 4/16, asthma, hyperlipidemia and hypertension who was brought to the ER by EMS for hypoxia. According to the patient, last night she was unable to sleep due to shortness of breath, all of sudden she felt it worsening and woke her  to call EMS. EMS found her tachypneic with an SPO2 in the 70s improved to 98% on 2liters via nasal cannula.  In the ER, given Albuterol nebulizer treatment x 1 with improvement in hypoxia. CTA of the chest negative for PE, dissection or consolidation. BNP and troponin within normal limits. COVID negative. After albuterol treatment Spo2 98% on room air at rest and 100% with ambulation to the bathroom. At the time of my examination patient denies sob, chest pain, palpitations, cough or wheezing. Post op she completed her prescribed Xarelto prophylaxis yesterday. On Sunday she had complaints of leg pain; she was seen in the ER. Duplex was negative for DVT LLE; LLE xray showed increased anterior thigh swelling and was sent home.  Suspect dyspnea secondary to anxiety. Stable for discharge home to follow up with outpatient pulmonologist\par \par 56 mins spent \par \par  Med Reconciliation:\par Medication Reconciliation Status	Admission Reconciliation is Completed\par Discharge Reconciliation is Completed\par Discharge Medications	Albuterol (Eqv-ProAir HFA) 90 mcg/inh inhalation aerosol: 2 puff(s) inhaled every 6 hours, As Needed\par fluvastatin 20 mg oral capsule: 1 cap(s) orally once a day\par Hyzaar 100 mg-12.5 mg oral tablet: 1 tab(s) orally once a day\par Protonix 40 mg oral delayed release tablet: 1 tab(s) orally once a day\par Singulair 10 mg oral tablet: 1 tab(s) orally once a day\par ,\par ,\par \par  Care Plan/Procedures:\par Discharge Diagnoses, Assessment and Plan of Treatment	PRINCIPAL DISCHARGE DIAGNOSIS\par Diagnosis: Hypoxia\par Assessment and Plan of Treatment: Suspected secondary to asthma exacerbation with a compnonent of anxiety \par Resolved now\par \par \par SECONDARY DISCHARGE DIAGNOSES\par Diagnosis: S/P total knee replacement, left\par Assessment and Plan of Treatment:\par 	\par Goal(s)	To get better and follow your care plan as instructed.\par \par  Follow Up:\par Activity	No restrictions\par Care Providers for Follow up (PCP/Outpatient Provider)	Jeffrey Stephenson)\par Orthopaedic Surgery\par [address] [place] [address], [facility]\[place] [address]\par Phone: [phone]\par Fax: [phone]\par Follow Up Time:\par \par  Quality Measures:\par Patient Condition	Stable\par Hospice Patient	No\par Does the patient have difficulty running errands alone like visiting a doctor’s office or shopping?	No\par Does the patient have difficulty climbing stairs?	No\par Cognition: The patient has	No difficulties\par Does the patient have a principal diagnosis of ischemic stroke, hemorrhagic stroke, or TIA?	No\par Does the patient have a principal diagnosis of Acute Myocardial Infarction?	No\par Has the patient had a Percutaneous Coronary Intervention?	No\par \par  Document Complete:\par Physician Section Complete	This document is complete and the patient is ready for discharge.\par For questions about your prescriptions, please call:	[phone]\par Care Provider Seen in [facility]	Taylor Gaffney\par Is this contact telephone number correct?	Yes\par \par \par \par Electronic Signatures:\par Taylor Gaffney (NIKA)  (Signed 28-Apr-2021 15:00)\par 	Authored: Hospital Course, Med Reconciliation, Care Plan/Procedures, Follow Up, Quality Measures, Document Complete\par \par \par Last Updated: 28-Apr-2021 15:00 by Taylor Gaffney (NIKA)\par \par \par

## 2021-04-30 NOTE — PHYSICAL EXAM
[No Acute Distress] : no acute distress [Well Nourished] : well nourished [Well Developed] : well developed [Well-Appearing] : well-appearing [Normal Sclera/Conjunctiva] : normal sclera/conjunctiva [Normal Oropharynx] : the oropharynx was normal [Supple] : supple [No Respiratory Distress] : no respiratory distress  [Clear to Auscultation] : lungs were clear to auscultation bilaterally [No Accessory Muscle Use] : no accessory muscle use [Pedal Pulses Present] : the pedal pulses are present [No Extremity Clubbing/Cyanosis] : no extremity clubbing/cyanosis [Soft] : abdomen soft [Non Tender] : non-tender [Non-distended] : non-distended [Normal Bowel Sounds] : normal bowel sounds [No Spinal Tenderness] : no spinal tenderness [Grossly Normal Strength/Tone] : grossly normal strength/tone [No Rash] : no rash [Coordination Grossly Intact] : coordination grossly intact [No Focal Deficits] : no focal deficits [Normal Affect] : the affect was normal [Alert and Oriented x3] : oriented to person, place, and time [Normal Insight/Judgement] : insight and judgment were intact [de-identified] : mild dyspnea [de-identified] : 1+ edema LLE, surgical leg [de-identified] : resolving ecchymosis LLE, incision well approx

## 2021-04-30 NOTE — REVIEW OF SYSTEMS
[Negative] : Psychiatric [FreeTextEntry6] : + dyspnea [FreeTextEntry9] : s/p L TKR [de-identified] : steri L knee

## 2021-04-30 NOTE — ASSESSMENT
[FreeTextEntry1] : POD # 14 post L TKR\par To ED on 4/25 for L knee pain, DVT ruled out. + Baker's cyst\par To ED 4/28 for SOB, O2 sat 70's, work up negative.\par today c/o SOB sensation, Chest clear, VSS, O2 sat 99%\par States had this twice before, Pulm Dr. Lares put her on flovent and it helped.\par His office is closed today.\par \par Pain improving LLE, transfer independently, RW to ambulate.\par Staples removed today, steri's, well approximated. No s.s infection.\par [facility] in home

## 2021-04-30 NOTE — PLAN
[FreeTextEntry1] : A/P\par 1. L TKR\par progressing nicely, completed AC for DVT prophylaxis.\par pain management, ice, PT, elevation\par f/u with surgeon\par \par 2. Mild intermittent asthma:\par Rx sent for albuterol and flovent\par f/u with pulm.\par Call for worsening sx.

## 2021-05-10 PROBLEM — J45.20 MILD INTERMITTENT ASTHMA, UNCOMPLICATED: Chronic | Status: ACTIVE | Noted: 2021-04-28

## 2021-05-10 PROBLEM — E78.00 PURE HYPERCHOLESTEROLEMIA, UNSPECIFIED: Chronic | Status: ACTIVE | Noted: 2021-04-28

## 2021-05-10 PROBLEM — I10 ESSENTIAL (PRIMARY) HYPERTENSION: Chronic | Status: ACTIVE | Noted: 2021-04-28

## 2021-05-12 ENCOUNTER — APPOINTMENT (OUTPATIENT)
Dept: PULMONOLOGY | Facility: CLINIC | Age: 76
End: 2021-05-12
Payer: MEDICARE

## 2021-05-12 VITALS
HEART RATE: 88 BPM | OXYGEN SATURATION: 99 % | DIASTOLIC BLOOD PRESSURE: 85 MMHG | SYSTOLIC BLOOD PRESSURE: 133 MMHG | TEMPERATURE: 97.1 F

## 2021-05-12 DIAGNOSIS — Z23 ENCOUNTER FOR IMMUNIZATION: ICD-10-CM

## 2021-05-12 DIAGNOSIS — R06.01 ORTHOPNEA: ICD-10-CM

## 2021-05-12 DIAGNOSIS — R06.00 DYSPNEA, UNSPECIFIED: ICD-10-CM

## 2021-05-12 PROCEDURE — 99205 OFFICE O/P NEW HI 60 MIN: CPT

## 2021-05-12 RX ORDER — PANTOPRAZOLE 40 MG/1
40 TABLET, DELAYED RELEASE ORAL
Refills: 1 | Status: DISCONTINUED | COMMUNITY
Start: 2021-04-30 | End: 2021-05-12

## 2021-05-12 NOTE — REVIEW OF SYSTEMS
[SOB on Exertion] : sob on exertion [Orthopnea] : orthopnea [Palpitations] : palpitations [Seasonal Allergies] : seasonal allergies [Arthralgias] : arthralgias [Negative] : Endocrine [Chest Discomfort] : no chest discomfort [TextBox_3] : resp distress with any effort  [TextBox_104] : multiple skin cancers  [TextBox_113] : will seek lab work

## 2021-05-12 NOTE — HISTORY OF PRESENT ILLNESS
[TextBox_4] : the patient is 75 year F with SOB after a TKR at [facility]. She has had several visits to the ERs for dyspnea and had a low SaO2 was in the ER at [facility] but the reading was in the ambulance  The patient had several skin cancers and had one bxed on the thigh of the TKR and that area become inflamed and she was placed on Doxycycline and then had the reaction that sent her to the Er    She had Asthma.  she never smoked  She never had pneumonia      she is not sleeping in her bed she is concerned her breathing will not be sufficient for her Oxygenation   She started flovent 110 and then 220 and then added prednisone nothing helped

## 2021-05-12 NOTE — ASSESSMENT
[FreeTextEntry1] : the patient has ARENAS and has a hx of Asthma She does not demonstrate any asthmatic SX She has anincreased hr and RR on my exam  Hr 104 and her RR 18 The patient has few visits to the ER and has had a pulmonary w/u that did not reveal any pulmonary emboli, infiltrates, or effusions  the Cardiac w/u was limited to troponin and EKG which did not reveal any acute problem  The patient's lab showed a hgb that ranged from 14.4 to 11.o  none enough to cause dyspnea  I will advise more cardiac w/u and will order a BNP, CBC and repeat CMP   obtain echo results from Dr Melendez's and Dr Richard's office \par

## 2021-05-12 NOTE — REASON FOR VISIT
[Initial] : an initial visit [Asthma] : asthma [Shortness of Breath] : shortness of breath [Spouse] : spouse [TextBox_44] : s/p Left TKR April 16th [TextBox_13] : Dr. Lares

## 2021-06-16 ENCOUNTER — APPOINTMENT (OUTPATIENT)
Dept: PULMONOLOGY | Facility: CLINIC | Age: 76
End: 2021-06-16

## 2021-07-12 NOTE — ED ADULT NURSE NOTE - NS ED NURSE LEVEL OF CONSCIOUSNESS MENTAL STATUS
Awake/Alert/Cooperative Griseofulvin Counseling:  I discussed with the patient the risks of griseofulvin including but not limited to photosensitivity, cytopenia, liver damage, nausea/vomiting and severe allergy.  The patient understands that this medication is best absorbed when taken with a fatty meal (e.g., ice cream or french fries).

## 2022-08-25 ENCOUNTER — RESULT REVIEW (OUTPATIENT)
Age: 77
End: 2022-08-25

## 2022-08-29 ENCOUNTER — TRANSCRIPTION ENCOUNTER (OUTPATIENT)
Age: 77
End: 2022-08-29

## 2022-10-26 ENCOUNTER — NON-APPOINTMENT (OUTPATIENT)
Age: 77
End: 2022-10-26

## 2022-10-26 DIAGNOSIS — Z15.89 GENETIC SUSCEPTIBILITY TO OTHER DISEASE: ICD-10-CM

## 2022-10-26 DIAGNOSIS — M81.0 AGE-RELATED OSTEOPOROSIS W/OUT CURRENT PATHOLOGICAL FRACTURE: ICD-10-CM

## 2022-10-26 DIAGNOSIS — M17.12 UNILATERAL PRIMARY OSTEOARTHRITIS, LEFT KNEE: ICD-10-CM

## 2022-10-26 DIAGNOSIS — I10 ESSENTIAL (PRIMARY) HYPERTENSION: ICD-10-CM

## 2022-10-26 DIAGNOSIS — M25.561 PAIN IN RIGHT KNEE: ICD-10-CM

## 2022-10-26 RX ORDER — DENOSUMAB 60 MG/ML
60 INJECTION SUBCUTANEOUS
Refills: 0 | Status: ACTIVE | COMMUNITY

## 2022-10-26 RX ORDER — CHOLECALCIFEROL (VITAMIN D3) 50 MCG
50 MCG TABLET ORAL
Refills: 0 | Status: ACTIVE | COMMUNITY

## 2022-10-26 RX ORDER — IBUPROFEN 200 MG
600 CAPSULE ORAL DAILY
Refills: 0 | Status: ACTIVE | COMMUNITY

## 2022-11-09 ENCOUNTER — APPOINTMENT (OUTPATIENT)
Dept: ORTHOPEDIC SURGERY | Facility: CLINIC | Age: 77
End: 2022-11-09

## 2022-11-09 VITALS — HEIGHT: 62 IN | BODY MASS INDEX: 29.44 KG/M2 | WEIGHT: 160 LBS

## 2022-11-09 PROCEDURE — 20610 DRAIN/INJ JOINT/BURSA W/O US: CPT | Mod: RT

## 2022-11-09 PROCEDURE — 99213 OFFICE O/P EST LOW 20 MIN: CPT | Mod: 25

## 2022-11-09 NOTE — DISCUSSION/SUMMARY
[de-identified] : And at this point is activities as tolerated.  She will ice the knee and not exercise for 48 hours.  We will see her back in the office as needed.  If the Durolane works well again we will repeat it in 7 months.

## 2022-11-09 NOTE — HISTORY OF PRESENT ILLNESS
[0] : 0 [1] : 1 [Other:____] : [unfilled] [de-identified] : 7 [] : Post Surgical Visit: no [FreeTextEntry3] : N/A Chronic [FreeTextEntry5] : 78 Y/O pt presents for f/u with INJ at RT knee. pt states no pain [de-identified] : RT Knee [de-identified] : HA

## 2022-11-09 NOTE — PROCEDURE
[FreeTextEntry3] : Sterile conditions of the right knee was injected intra-articularly with 3 cc of Durolane.  The patient tolerated the procedure well.

## 2022-11-09 NOTE — PHYSICAL EXAM
[de-identified] : Examination of the right lower extremity reveals normal neurovascular exam.  Examination of right knee reveals slight limitation of range of motion from 5-125 degrees with varus deformity.  There is medial joint line pain with no Breanna's sign instability or effusion.  There is no redness rashes.

## 2022-11-09 NOTE — ASSESSMENT
[FreeTextEntry1] : Comes in today for follow-up on her right knee.  She is starting to get pain again in the right knee consistent with her osteoarthritis.  The Durolane shot I gave her back in March worked beautifully but over the last several weeks the pain is started to come back with pain walking distances stairs and occasionally at night.  The pain is not severe.

## 2023-06-07 ENCOUNTER — APPOINTMENT (OUTPATIENT)
Dept: ORTHOPEDIC SURGERY | Facility: CLINIC | Age: 78
End: 2023-06-07
Payer: MEDICARE

## 2023-06-07 VITALS — BODY MASS INDEX: 30 KG/M2 | WEIGHT: 163 LBS | HEIGHT: 62 IN

## 2023-06-07 PROCEDURE — 99213 OFFICE O/P EST LOW 20 MIN: CPT | Mod: 25

## 2023-06-07 PROCEDURE — 20610 DRAIN/INJ JOINT/BURSA W/O US: CPT | Mod: RT

## 2023-06-07 RX ORDER — FLUVASTATIN SODIUM 80 MG/1
80 TABLET, EXTENDED RELEASE ORAL DAILY
Refills: 0 | Status: DISCONTINUED | COMMUNITY
End: 2023-06-07

## 2023-06-11 NOTE — HISTORY OF PRESENT ILLNESS
[5] : 5 [Dull/Aching] : dull/aching [Intermittent] : intermittent [Rest] : rest [Ice] : ice [Injection therapy] : injection therapy [1] : 1 [Other:____] : [unfilled] [] : Post Surgical Visit: no [FreeTextEntry1] : Rt. Knee [FreeTextEntry3] : N/A Chronic [FreeTextEntry5] : 78 y/o F presents for Durolane INJ Rt. Knee. Pt reports pain levels remain moderate since last visit. Previous tx Durolane 11/9/22 with good relief.  [de-identified] : 11/9/23 [de-identified] : Dr. Stephenson [de-identified] : Jj [de-identified] : RT Knee [de-identified] : HA

## 2023-06-11 NOTE — ASSESSMENT
[FreeTextEntry1] : Patient comes in today for evaluation of her right knee.  She is here for a Durolane shot.  The last shot I gave her back in November worked beautifully and now the pain is gradually come back.  She has moderate pain walking distances, doing stairs, and occasionally at night.\par \par Examination of the right lower extremity reveals a normal neurovascular exam.  Examination of the right knee reveals range of motion 0 to 125 degrees with no deformity.  There is no redness, rashes, or visible scars.  She has no effusion.  There is no instability or apprehension and patella tracking is normal.  Right hip exam is normal.\par \par Under sterile conditions  the right knee was injected intra-articularly with 60mg / 3 cc of Durolane.  The patient tolerated the procedure well.\par \par Plan at this point is ice and activity modification we will see her back in the office as needed.

## 2024-01-10 ENCOUNTER — APPOINTMENT (OUTPATIENT)
Dept: ORTHOPEDIC SURGERY | Facility: CLINIC | Age: 79
End: 2024-01-10
Payer: MEDICARE

## 2024-01-10 VITALS — HEIGHT: 62 IN | BODY MASS INDEX: 30.91 KG/M2 | WEIGHT: 168 LBS

## 2024-01-10 DIAGNOSIS — M17.11 UNILATERAL PRIMARY OSTEOARTHRITIS, RIGHT KNEE: ICD-10-CM

## 2024-01-10 PROCEDURE — 20610 DRAIN/INJ JOINT/BURSA W/O US: CPT | Mod: RT

## 2024-01-10 PROCEDURE — 99213 OFFICE O/P EST LOW 20 MIN: CPT | Mod: 25

## 2024-01-10 NOTE — ASSESSMENT
[FreeTextEntry1] : Patient comes in today follow-up on her right knee.  She continues have pain consistent with her osteoarthritis.  She has trouble walking distances, doing stairs and occasionally at night.  She has had Durolane in the past which has worked beautifully.  Her pain is not severe enough to do surgery.  She had a previous left total knee replacement by me in April 2021 which is pain-free however it does feel stiff and numb at times.  She did have a rough postoperative course and we will try to avoid surgery for her right knee at all costs.  Examination right lower extremity feels normal neurovascular exam.  Examination of right knee reveals range of motion from 0 to 120 degrees.  There is trace effusion.  There is mild diffuse joint line pain with no Breanna's sign or instability.  There is no redness, rashes or visible scars.  Under sterile conditions  the right knee was injected intra-articularly with 60mg / 3 cc of Durolane.  The patient tolerated the procedure well.  Plan at this point is ice and activity modification we will see her back in the office as needed.

## 2024-01-10 NOTE — HISTORY OF PRESENT ILLNESS
[5] : 5 [Dull/Aching] : dull/aching [Intermittent] : intermittent [Rest] : rest [Ice] : ice [Injection therapy] : injection therapy [1] : 1 [Other:____] : [unfilled] [] : Post Surgical Visit: no [FreeTextEntry5] : 77 y/o F presents for Durolane INJ Rt. Knee today. [de-identified] : RT Knee [de-identified] : HA

## 2024-08-14 ENCOUNTER — APPOINTMENT (OUTPATIENT)
Dept: ORTHOPEDIC SURGERY | Facility: CLINIC | Age: 79
End: 2024-08-14
Payer: MEDICARE

## 2024-08-14 VITALS — BODY MASS INDEX: 29.26 KG/M2 | HEIGHT: 62 IN | WEIGHT: 159 LBS

## 2024-08-14 DIAGNOSIS — M17.11 UNILATERAL PRIMARY OSTEOARTHRITIS, RIGHT KNEE: ICD-10-CM

## 2024-08-14 PROCEDURE — 20610 DRAIN/INJ JOINT/BURSA W/O US: CPT | Mod: RT

## 2024-08-14 PROCEDURE — 99212 OFFICE O/P EST SF 10 MIN: CPT | Mod: 25

## 2024-08-14 PROCEDURE — 73564 X-RAY EXAM KNEE 4 OR MORE: CPT | Mod: RT

## 2024-08-16 NOTE — ASSESSMENT
[FreeTextEntry1] : The patient comes in today for follow-up on her right knee.  She continues to have pain consistent with her osteoarthritis.  She has pain walking distances, doing stairs and occasionally at night.  She had a Durolane injection back in November 2024 which worked beautifully.  She is now requesting similar treatment.  Examination of the right lower extremity reveals normal neurovascular exam.  Examination of the right knee reveals range of motion 0 to 125 degrees with mild lateral joint pain and crepitation.  There is no effusion.  There is no instability or apprehension with normal patellar tracking.  There is no redness, rashes or visible scars.  X-rays done in the office today of the right knee 4 views weightbearing show tricompartmental arthritis.  There are bone-on-bone changes of the lateral compartment particularly seen on the flexed knee view.  There are no obvious tumors, mass or calcifications seen.  Under sterile conditions  the right knee was injected intra-articularly with 60mg / 3 cc of Durolane.  The patient tolerated the procedure well.  The plan at this point is ice and activity modification I will see her back in the office as needed.  We will continue conservative management as long as it continues to work.

## 2024-08-16 NOTE — HISTORY OF PRESENT ILLNESS
[5] : 5 [Dull/Aching] : dull/aching [Intermittent] : intermittent [Rest] : rest [Ice] : ice [Injection therapy] : injection therapy [] : no

## 2024-12-23 NOTE — H&P PST ADULT - SUPERVISING ATTENDING
Dr Stephenson I have personally seen the patient with the PA/NP/Resident. I agree with their assessment and plan unless otherwise noted. See MDM

## 2025-02-26 ENCOUNTER — APPOINTMENT (OUTPATIENT)
Facility: CLINIC | Age: 80
End: 2025-02-26
Payer: MEDICARE

## 2025-02-26 VITALS — WEIGHT: 159 LBS | HEIGHT: 62 IN | BODY MASS INDEX: 29.26 KG/M2

## 2025-02-26 DIAGNOSIS — Z96.652 PRESENCE OF LEFT ARTIFICIAL KNEE JOINT: ICD-10-CM

## 2025-02-26 DIAGNOSIS — M17.12 UNILATERAL PRIMARY OSTEOARTHRITIS, LEFT KNEE: ICD-10-CM

## 2025-02-26 DIAGNOSIS — M17.11 UNILATERAL PRIMARY OSTEOARTHRITIS, RIGHT KNEE: ICD-10-CM

## 2025-02-26 PROCEDURE — 20610 DRAIN/INJ JOINT/BURSA W/O US: CPT | Mod: RT

## 2025-02-26 PROCEDURE — 73564 X-RAY EXAM KNEE 4 OR MORE: CPT | Mod: LT

## 2025-02-26 PROCEDURE — 99214 OFFICE O/P EST MOD 30 MIN: CPT | Mod: 25

## 2025-02-26 RX ORDER — METHYLPREDNISOLONE 4 MG/1
4 TABLET ORAL
Qty: 1 | Refills: 0 | Status: ACTIVE | COMMUNITY
Start: 2025-02-26 | End: 1900-01-01

## 2025-03-03 ENCOUNTER — APPOINTMENT (OUTPATIENT)
Facility: CLINIC | Age: 80
End: 2025-03-03

## 2025-03-10 RX ORDER — METHYLPREDNISOLONE 4 MG/1
4 TABLET ORAL
Qty: 1 | Refills: 0 | Status: ACTIVE | COMMUNITY
Start: 2025-03-10 | End: 1900-01-01

## 2025-03-14 ENCOUNTER — APPOINTMENT (OUTPATIENT)
Facility: CLINIC | Age: 80
End: 2025-03-14
Payer: MEDICARE

## 2025-03-14 ENCOUNTER — RESULT REVIEW (OUTPATIENT)
Age: 80
End: 2025-03-14

## 2025-03-14 VITALS — WEIGHT: 159 LBS | HEIGHT: 62 IN | BODY MASS INDEX: 29.26 KG/M2

## 2025-03-14 DIAGNOSIS — M79.662 PAIN IN LEFT LOWER LEG: ICD-10-CM

## 2025-03-14 DIAGNOSIS — Z96.652 PRESENCE OF LEFT ARTIFICIAL KNEE JOINT: ICD-10-CM

## 2025-03-14 DIAGNOSIS — M17.12 UNILATERAL PRIMARY OSTEOARTHRITIS, LEFT KNEE: ICD-10-CM

## 2025-03-14 PROCEDURE — 99213 OFFICE O/P EST LOW 20 MIN: CPT

## 2025-03-17 ENCOUNTER — NON-APPOINTMENT (OUTPATIENT)
Age: 80
End: 2025-03-17

## 2025-03-18 ENCOUNTER — NON-APPOINTMENT (OUTPATIENT)
Age: 80
End: 2025-03-18

## 2025-09-15 ENCOUNTER — APPOINTMENT (OUTPATIENT)
Facility: CLINIC | Age: 80
End: 2025-09-15